# Patient Record
Sex: FEMALE | Race: WHITE | NOT HISPANIC OR LATINO | Employment: FULL TIME | URBAN - METROPOLITAN AREA
[De-identification: names, ages, dates, MRNs, and addresses within clinical notes are randomized per-mention and may not be internally consistent; named-entity substitution may affect disease eponyms.]

---

## 2017-02-14 ENCOUNTER — GENERIC CONVERSION - ENCOUNTER (OUTPATIENT)
Dept: OTHER | Facility: OTHER | Age: 57
End: 2017-02-14

## 2017-07-21 ENCOUNTER — GENERIC CONVERSION - ENCOUNTER (OUTPATIENT)
Dept: OTHER | Facility: OTHER | Age: 57
End: 2017-07-21

## 2017-08-11 ENCOUNTER — GENERIC CONVERSION - ENCOUNTER (OUTPATIENT)
Dept: OTHER | Facility: OTHER | Age: 57
End: 2017-08-11

## 2017-09-22 ENCOUNTER — GENERIC CONVERSION - ENCOUNTER (OUTPATIENT)
Dept: OTHER | Facility: OTHER | Age: 57
End: 2017-09-22

## 2017-11-03 ENCOUNTER — GENERIC CONVERSION - ENCOUNTER (OUTPATIENT)
Dept: OTHER | Facility: OTHER | Age: 57
End: 2017-11-03

## 2017-12-28 ENCOUNTER — ALLSCRIPTS OFFICE VISIT (OUTPATIENT)
Dept: OTHER | Facility: OTHER | Age: 57
End: 2017-12-28

## 2017-12-29 ENCOUNTER — GENERIC CONVERSION - ENCOUNTER (OUTPATIENT)
Dept: OTHER | Facility: OTHER | Age: 57
End: 2017-12-29

## 2017-12-29 DIAGNOSIS — Z11.51 ENCOUNTER FOR SCREENING FOR HUMAN PAPILLOMAVIRUS (HPV): ICD-10-CM

## 2017-12-29 DIAGNOSIS — Z12.4 ENCOUNTER FOR SCREENING FOR MALIGNANT NEOPLASM OF CERVIX: ICD-10-CM

## 2017-12-29 PROCEDURE — G0145 SCR C/V CYTO,THINLAYER,RESCR: HCPCS | Performed by: INTERNAL MEDICINE

## 2017-12-29 PROCEDURE — 87624 HPV HI-RISK TYP POOLED RSLT: CPT | Performed by: INTERNAL MEDICINE

## 2018-01-02 ENCOUNTER — GENERIC CONVERSION - ENCOUNTER (OUTPATIENT)
Dept: OTHER | Facility: OTHER | Age: 58
End: 2018-01-02

## 2018-01-02 ENCOUNTER — LAB REQUISITION (OUTPATIENT)
Dept: LAB | Facility: HOSPITAL | Age: 58
End: 2018-01-02
Payer: COMMERCIAL

## 2018-01-02 DIAGNOSIS — Z12.4 ENCOUNTER FOR SCREENING FOR MALIGNANT NEOPLASM OF CERVIX: ICD-10-CM

## 2018-01-02 DIAGNOSIS — Z11.51 ENCOUNTER FOR SCREENING FOR HUMAN PAPILLOMAVIRUS (HPV): ICD-10-CM

## 2018-01-02 LAB
25(OH)D3 SERPL-MCNC: 33 NG/ML (ref 30–100)
BUN SERPL-MCNC: 20 MG/DL (ref 7–25)
BUN/CREA RATIO (HISTORICAL): 18 (CALC) (ref 6–22)
CHLORIDE SERPL-SCNC: 103 MMOL/L (ref 98–110)
CHOLEST SERPL-MCNC: 203 MG/DL
CHOLEST/HDLC SERPL: 2.3 (CALC)
CO2 SERPL-SCNC: 28 MMOL/L (ref 20–31)
CREAT SERPL-MCNC: 1.1 MG/DL (ref 0.5–1.05)
EGFR AFRICAN AMERICAN (HISTORICAL): 65 ML/MIN/1.73M2
EGFR-AMERICAN CALC (HISTORICAL): 56 ML/MIN/1.73M2
GLUCOSE (HISTORICAL): 81 MG/DL (ref 65–99)
HDLC SERPL-MCNC: 88 MG/DL
HEPATITIS C ANTIBODY (HISTORICAL): NORMAL
LDL CHOLESTEROL (HISTORICAL): 100 MG/DL (CALC)
NON-HDL-CHOL (CHOL-HDL) (HISTORICAL): 115 MG/DL (CALC)
POTASSIUM SERPL-SCNC: 4.2 MMOL/L (ref 3.5–5.3)
SIGNAL TO CUT-OFF (HISTORICAL): 0.11
SODIUM SERPL-SCNC: 140 MMOL/L (ref 135–146)
TRIGL SERPL-MCNC: 66 MG/DL
TSH SERPL DL<=0.05 MIU/L-ACNC: 1.41 MIU/L

## 2018-01-05 LAB
HPV RRNA GENITAL QL NAA+PROBE: NORMAL
LAB AP GYN PRIMARY INTERPRETATION: NORMAL
Lab: NORMAL

## 2018-01-06 ENCOUNTER — GENERIC CONVERSION - ENCOUNTER (OUTPATIENT)
Dept: OTHER | Facility: OTHER | Age: 58
End: 2018-01-06

## 2018-01-12 NOTE — RESULT NOTES
Verified Results  (1) CBC/PLT/DIFF [id number] 07:17AM [facility]     Test Name Result Flag Reference   WHITE BLOOD CELL COUNT 5 3 Thousand/uL  3 8-10 8   RED BLOOD CELL COUNT 4 42 Million/uL  3 80-5 10   HEMOGLOBIN 12 8 g/dL  11 7-15 5   HEMATOCRIT 41 0 %  35 0-45 0   MCV 92 8 fL  80 0-100 0   MCH 29 0 pg  27 0-33 0   MCHC 31 3 g/dL L 32 0-36 0   RDW 14 4 %  11 0-15 0   PLATELET COUNT 502 Thousand/uL  140-400   MPV 9 6 fL  7 5-11 5   ABSOLUTE NEUTROPHILS 3535 cells/uL  3357-2297   ABSOLUTE LYMPHOCYTES 1283 cells/uL  850-3900   ABSOLUTE MONOCYTES 376 cells/uL  200-950   ABSOLUTE EOSINOPHILS 74 cells/uL     ABSOLUTE BASOPHILS 32 cells/uL  0-200   NEUTROPHILS 66 7 %     LYMPHOCYTES 24 2 %     MONOCYTES 7 1 %     EOSINOPHILS 1 4 %     BASOPHILS 0 6 %       (1) COMPREHENSIVE METABOLIC PANEL [id number] 76:16CF [facility]     Test Name Result Flag Reference   GLUCOSE 83 mg/dL  65-99   Fasting reference interval   UREA NITROGEN (BUN) 22 mg/dL  7-25   CREATININE 1 03 mg/dL  0 50-1 05   For patients >52years of age, the reference limit  for Creatinine is approximately 13% higher for people  identified as -American  eGFR NON-AFR   AMERICAN 62 mL/min/1 73m2  > OR = 60   eGFR AFRICAN AMERICAN 72 mL/min/1 73m2  > OR = 60   BUN/CREATININE RATIO   4-43   NOT APPLICABLE (calc)   SODIUM 138 mmol/L  135-146   POTASSIUM 4 3 mmol/L  3 5-5 3   CHLORIDE 103 mmol/L     CARBON DIOXIDE 28 mmol/L  19-30   CALCIUM 9 5 mg/dL  8 6-10 4   PROTEIN, TOTAL 7 4 g/dL  6 1-8 1   ALBUMIN 4 7 g/dL  3 6-5 1   GLOBULIN 2 7 g/dL (calc)  1 9-3 7   ALBUMIN/GLOBULIN RATIO 1 7 (calc)  1 0-2 5   BILIRUBIN, TOTAL 0 5 mg/dL  0 2-1 2   ALKALINE PHOSPHATASE 53 U/L     AST 16 U/L  10-35   ALT 15 U/L  6-29     (1) LIPID PANEL, FASTING [id number] 07:17AM [facility]     Test Name Result Flag Reference   CHOLESTEROL, TOTAL 172 mg/dL  125-200   HDL CHOLESTEROL 78 mg/dL  > OR = 46   TRIGLICERIDES 47 mg/dL  <977   LDL-CHOLESTEROL 85 mg/dL (calc) <130   Desirable range <100 mg/dL for patients with CHD or  diabetes and <70 mg/dL for diabetic patients with  known heart disease  CHOL/HDLC RATIO 2 2 (calc)  < OR = 5 0   NON HDL CHOLESTEROL 94 mg/dL (calc)     Target for non-HDL cholesterol is 30 mg/dL higher than   LDL cholesterol target  (1)  [id number] 07:17AM [facility]   REPORT COMMENT:  FASTING:YES     Test Name Result Flag Reference    22 U/mL  <35   This test was performed using the Sheldon Soledad  Chemiluminescent method  Values obtained from  different assay methods cannot be used  interchangeably   levels, regardless of  value, should not be interpreted as absolute  evidence of the presence or absence of disease       (Q) TSH, 3RD GENERATION [id number] 07:17AM [facility]     Test Name Result Flag Reference   TSH 0 87 mIU/L     Reference Range                         > or = 20 Years  0 40-4 50                              Pregnancy Ranges            First trimester    0 26-2 66            Second trimester   0 55-2 73            Third trimester    0 43-2 91       Discussion/Summary   Eze Coe, all the blood tests are normal      Signatures   Electronically signed by : KEYONA Vila ; Mar 14 2016  9:41AM EST                       (Author)

## 2018-01-13 NOTE — RESULT NOTES
Verified Results  MAMMO DIAGNOSTIC BILATERAL W CAD [id number] 05:04PM [facility]     Test Name Result Flag Reference   MAMMO DIAGNOSTIC BILATERAL W CAD (Report)     Patient History:   Patient has history of breast cancer at age 41 and had first    child at age 30  Family history of colorectal cancer in 2 maternal cousins, breast   cancer in sister at age 39, ovarian cancer in sister at age 48,    and breast cancer in sister at age 47  Benign lumpectomy of the right breast, March 22, 2011  Malignant   WB stereo brst biopsy of the right breast, February 23, 2011  Benign -WBUS BREAST GUIDANCE of the right breast, July 17, 2008  Reason for exam: history of breast cancer, conservation therapy  History of right lumpectomy  Mammo Diagnostic Bilateral W CAD: August 30, 2016 - Check In #:    [de-identified]   Bilateral MLO and CC view(s) were taken  CC with magnification    and ML with magnification view(s) were taken of the right breast      Technologist: YESENIA Lewis (YESENIA)(M)   Prior study comparison: August 28, 2015, bilateral WB digitl    bilat luis m performed at [facility]  August 27, 2014, bilateral WB digitl bilat luis m performed at [facility]  August 26, 2013, bilateral WB digitl    bilat luis m performed at [facility]  August 23, 2012, bilateral WB digitl bilat luis m performed at [facility]  August 22, 2011, right breast digital    diagnostic mammogram, performed at [facility]  March 22, 2011, right breast digital diagnostic mammogram,    performed at [facility]  February 23, 2011, right breast   unilateral diagnostic mammogram performed at [facility]  February 16, 2011, right breast unilateral    diagnostic mammogram performed at [facility]   August 26, 2010, right breast unilateral diagnostic    mammogram performed at [address] [place] [address] [facility]  August 19, 2010, digital bilateral screening mammogram, performed at    [facility]  The breast tissue is heterogeneously dense, potentially limiting    the sensitivity of mammography  Patient risk, included in this    report, assists in determining the appropriate screening regimen    (such as 3-D mammography or the inclusion of automated breast    ultrasound or MRI)  3-D mammography may also remain indicated as    screening  These images were obtained using digital technique    and with the assistance of Computer Aided Detection  Asymmetric    density and calcifications are present in the left breast  Spot    magnification views of the upper inner left breast calcifications   demonstrate them as indeterminate and stereotactic core biopsy    is recommended for further evaluation  An additional more faint    cluster is identified slightly inferior to the more coarse    cluster  If these could be identified on stereotactic imaging, a   2nd stereotactic core biopsy may be indicated  Otherwise,    excisional biopsy may be necessary  Previously described    asymmetric density in the medial left breast does not persist    reliably persist on spot compression views  If calcifications prove positive by biopsy, the patient would    benefit from MRI for further evaluation  ASSESSMENT: BiRad:4 - Suspicious - Left     Recommendation:   Stereotactic biopsy of the left breast x 2 if the more inferior    site can be visualized  Analyzed by CAD     8-10% of cancers will be missed on mammography  Management of a    palpable abnormality must be based on clinical grounds  Patients   will be notified of their results via letter from our facility  Accredited by [employer] of Radiology and FDA       Transcription Location: YESENIA Sifuentes 98: [id number]     Risk Value(s):   Myriad Table: 26 6%, MRS : Based on personal and/or    family history, consideration of hereditary risk assessment may    be warranted     Signed by:   Waynette Primrose, MD   8/30/16       Signatures   Electronically signed by : KEYONA Krueger ; Sep  2 2016 12:38PM EST                       (Author)

## 2018-01-14 NOTE — PROGRESS NOTES
Assessment   1  Screening for cervical cancer (V76 2) (Z12 4)  2  Hypothyroidism (244 9) (E03 9)  3  Screening for hyperlipidemia (V77 91) (Z13 220)  4  DM (diabetes mellitus screen) (V77 1) (Z13 1)  5  History of carcinoma in situ of breast (V13 89) (Z86 000)    Plan  DM (diabetes mellitus screen), History of carcinoma in situ of breast, Hypothyroidism,  FamHx: Ovarian Cancer    · (1) ; Status:Active; Requested for:19Feb2016;   Perform:[facility]; QYT:[id number];PFVWKVW; For:DM (diabetes mellitus   screen), History of carcinoma in situ of breast, Hypothyroidism,   FamHx: Ovarian Cancer; Ordered By:Reyes, Lea;   · (1) CBC/PLT/DIFF; Status:Active; Requested for:19Feb2016;   Perform:[facility]; JVE:[id number];VVPMNPG; For:DM (diabetes mellitus   screen), History of carcinoma in situ of breast, Hypothyroidism,   FamHx: Ovarian Cancer; Ordered By:Reyes, Lea;   · (1) COMPREHENSIVE METABOLIC PANEL; Status:Active; Requested for:19Feb2016;   Perform:[facility]; PELON:35HDY3216;IKJZAHH; For:DM (diabetes mellitus   screen), History of carcinoma in situ of breast, Hypothyroidism,   FamHx: Ovarian Cancer; Ordered By:Reyes, Lea;   · (1) LIPID PANEL, FASTING; Status:Active; Requested for:19Feb2016;   Perform:[facility]; JBF:[id number];NEFYDKP; For:DM (diabetes mellitus   screen), History of carcinoma in situ of breast, Hypothyroidism,   FamHx: Ovarian Cancer; Ordered By:Reyes, Lea;   · (1) TSH; Status:Active; Requested for:19Feb2016;   Perform:[facility]; QXD:38QZS6756;MYBLSDO; For:DM (diabetes mellitus   screen), History of carcinoma in situ of breast, Hypothyroidism,   FamHx: Ovarian Cancer; Ordered By:Reyes, Lea;   History of carcinoma in situ of breast    · MAMMO DIAGNOSTIC BILATERAL W CAD; Status:Hold For - Exact Date,Scheduling;  Requested for:Aug 2016;   Perform:[facility]; ZXZ:17FML7696;WYVWHLG; For:History of carcinoma in situ   of breast; Ordered By:Reyes, Lea;  Screening for cervical cancer    · (1) THIN PREP PAP WITH IMAGING; Status:Active; Requested for:38Fgv1816;   Perform:[facility]; MAW:44YGQ0768;SQXFEWI; For:Screening for cervical   cancer; Ordered By:Reyes, Lea;  PAP Maturation index required? : No  LMP: : Dec 2015  Reflex HPV? : Regardless of Interpretation   · (1) THIN PREP PAP WITH IMAGING; Status:Active; Requested for:22Uzp5172;   Perform:[facility]; MWO:67DLV1987;ZEOHXJZ; For:Screening for cervical cancer; Ordered By:Reyes, Lea;  PAP Maturation index required? : No  Reflex HPV? : Regardless of Interpretation    Discussion/Summary  health maintenance visit Currently, she eats an adequate diet and has an adequate exercise regimen  cervical cancer screening is current HPV and Pap Co-testing Done Today Breast cancer screening: mammogram is current and mammogram has been ordered  Colorectal cancer screening: colonoscopy is needed every ten years  Osteoporosis screening: bone mineral density testing is not indicated  Screening lab work includes hemoglobin, glucose and lipid profile  Chief Complaint  Gyn visit      History of Present Illness  HPI: requesting  testing bec of strong h/o ovarian cancer in family  willing to pay out of pocket   GYN HM, Adult [facility]: The patient is being seen for a gynecology evaluation  The last health maintenance visit was year(s) ago  General Health:   Reproductive health: the patient is perimenopausal   LMP Dec 25 2015  Screening: Cervical cancer screening includes a pap smear performed last year  Breast cancer screening includes a mammogram performed last year  Colorectal cancer screening includes a colonoscopy performed within the past ten years  metabolic screening reviewed and current        Review of Systems  no pelvic pain, no pelvic pressure, no vaginal pain, no vaginal discharge, no vaginal itching, no vaginal lump or mass, no vaginal odor, no nonmenstrual bleeding and no dysuria  Cardiovascular: varicose veins in right upper thigh, but no lower extremity edema  Other Symptoms: hot flashes  OB History  Pregnancy History (Brief):   Prior pregnancies: : 3  Para: 3 (living)       Active Problems   1  Basal cell carcinoma of skin (173 91) (C44 91)  2  Colon cancer screening (V76 51) (Z12 11)  3  DM (diabetes mellitus screen) (V77 1) (Z13 1)  4  Esophageal reflux (530 81) (K21 9)  5  History of carcinoma in situ of breast (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 89) (Z86 000)  6  Hypothyroidism (244 9) (E03 9)  7   Screening for cervical cancer (V76 2) (Z12 4)    Past Medical History    · History of Abdominal pain, RUQ (789 01) (R10 11)   · History of Age At First Period 17 Years Old (Menarche)   · History of Age At First Pregnancy 34 Years Old   · History of Atypical chest pain (786 59) (R07 89)   · History of Atypical chest pain (786 59) (R07 89)   · History of Breast Cancer (V10 3)   · History of Cervical Disc Degeneration C5 - C6 (722 4)   · History of Chest tightness or pressure (786 59) (R07 89)   · History of Duct, Solid Type, 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)   · History of Herpes zoster (053 9) (B02 9)   · History of basal cell carcinoma (V10 83) (Z85 828)   · History of hypothyroidism (V12 29) (Z86 39)   · History of Lump of skin (782 2) (R22 9)   · History of Lymphadenopathy (785 6) (R59 1)   · History of Neck strain (847 0) (S16 1XXA)   · History of Palpitations (785 1) (R00 2)   · History of Pap smear for cervical cancer screening (V76 2) (Z12 4)   · 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ies   · History of Tachycardia (785 0) (R00 0)    Surgical History    · History of Breast Surgery Lumpectomy   · History of Nose Surgery   · History of Reported Hx Of Breast Surgery For Biopsy    Family History    · Family history of Leukemia (V16 6)    · Family history of Breast Cancer (V16 3)   · Family history of Breast Cancer (V16 3)   · Family history of Breast Cancer (V16 3)   · Family history of Carcinoma Of The Anus   · Family history of Ovarian Cancer (V16 41)   · Family history of Ovarian Cancer (V16 41)    · Family history of Non-Hodgkin's Lymphoma    · Family history of Colon Cancer (V16 0)   · Family history of Malignant Melanoma Of The Skin (V16 8)    Social History    · Being A Social Drinker   · Exercising Regularly   · Never A Smoker    Current Meds  1  Levothyroxine Sodium 112 MCG Oral Tablet; Take 1 tablet daily; Therapy: 34GOP1130 to (Evaluate:22Oct2016)  Requested for: 24FKZ4273; Last   Rx:26Jan2016 Ordered  2  Omeprazole 20 MG Oral Capsule Delayed Release; TAKE ONE CAPSULE EVERY DAY; Therapy: 15AJY8554 to (Evaluate:55Reo1460)  Requested for: 99EJZ6194; Last   Rx:77Jsy9585 Ordered  3  Synthroid 112 MCG Oral Tablet; TAKE 1 TABLET DAILY; Therapy: 61TYU1585 to (Evaluate:93Ifa0646)  Requested for: 53CAE4431; Last   Rx:16Jun2014 Ordered    Allergies   1  No Known Drug Allergies   2  No Known Environmental Allergies  3  No Known Food Allergies    Vitals   Recorded: 85GOR4691 03:58PM   Heart Rate 68   Respiration 18   Systolic 667, LUE, Sitting   Diastolic 62, LUE, Sitting   Height 5 ft 5 in   Weight 140 lb    BMI Calculated 23 3   BSA Calculated 1 7     Physical Exam    Constitutional   General appearance: No acute distress, well appearing and well nourished  Genitourinary   External genitalia: Normal and no lesions appreciated  Vagina: Normal, no lesions or dryness appreciated  Urethral meatus: Normal     Cervix: Normal, no palpable masses  Uterus: Normal, non-tender, not enlarged, and no palpable masses  Adnexa/parametria: Normal, non-tender and no fullness or masses appreciated  Anus, perineum, and rectum: Normal sphincter tone, no masses, and no prolapse  Heme negative stool   Chest   Breasts: Normal and no dimpling or skin changes noted         Future Appointments    Date/Time Provider Specialty Site   02/20/2017 04:00 PM KEYONA Rodriguez   [facility]     Signatures   Electronically signed by : KEYONA Lugo ; Feb 19 2016  5:29PM EST                       (Author)

## 2018-01-15 NOTE — RESULT NOTES
Verified Results  (1) THIN PREP PAP WITH IMAGING 80RBX3549 10:46AM Sally Gross   Other High Risk HPV Negative, HPV 16 Negative, HPV 18 Negative  HPV types: 16,18,31,33,35,39,45,51,52,56,58,59,66 and 68 DNA are undetectable or below the pre-set threshold  Roche's FDA approved Sharri 4800 is utilized with strict adherence to the 's instruction  manual to test for the presence of High-Risk HPV DNA, as well as HPV 16 and HPV 18  This instrument  has been validated by our laboratory and/or by the   A negative result does not preclude the presence of HPV infection because results depend on adequate  specimen collection, absence of inhibitors and sufficient DNA to be detected  Additionally, HPV negative  results are not intended to prevent women from proceeding to colposcopy if clinically warranted  Positive HPV test results indicate the presence of any one or more of the high risk types, but since patients  are often co-infected with low-risk types it does not rule out the presence of low-risk types in patients  with mixed infections  Test Name Result Flag Reference   HPV, HIGH-RISK   HPV NEG, HPV16 NEG, HPV18 NEG   HPV NEG, HPV16 NEG, HPV18 NEG     (1) THIN PREP PAP WITH IMAGING [id number] 10:46AM Sally Ochoa     Test Name Result Flag Reference   LAB AP CASE REPORT (Report)     Gynecologic Cytology Report            Case: [id number]                  Authorizing Provider: Zaira Bailey MD       Collected:      02/19/2016 1046        First Screen:     ALIX Hernandez    Received:      02/22/2016 1046        Specimen:  LIQUID-BASED PAP, SCREENING, Cervix   LAB AP GYN PRIMARY INTERPRETATION      Negative for intraepithelial lesion or malignancy   LAB AP GYN SPECIMEN ADEQUACY      Satisfactory for evaluation  Endocervical/transformation zone component present     LAB AP GYN ADDITIONAL INFORMATION (Report)     [employer]'s FDA approved ,  and ThinPrep Imaging System are   utilized with strict adherence to the 's instruction manual to   prepare gynecologic and non-gynecologic cytology specimens for the   production of ThinPrep slides as well as for gynecologic ThinPrep imaging  These processes have been validated by our laboratory and/or by the     The Pap test is not a diagnostic procedure and should not be used as the   sole means to detect cervical cancer  It is only a screening procedure to   aid in the detection of cervical cancer and its precursors  Both   false-negative and false-positive results have been experienced  Your   patient's test result should be interpreted in this context together with   the history and clinical findings         Discussion/Summary   Prashant Medrano, your pap smear is normal      Signatures   Electronically signed by : KEYONA Cash ; Feb 25 2016  4:29PM EST                       (Author)

## 2018-01-22 VITALS
HEIGHT: 65 IN | SYSTOLIC BLOOD PRESSURE: 104 MMHG | DIASTOLIC BLOOD PRESSURE: 66 MMHG | OXYGEN SATURATION: 98 % | BODY MASS INDEX: 23.37 KG/M2 | HEART RATE: 75 BPM | WEIGHT: 140.25 LBS

## 2018-01-23 NOTE — RESULT NOTES
Message   Her creatinine or kidney function is slightly elevated- 1 1, should be under 1 05   I would like to recheck it, nonfasting this time in 2 weeks   The rest of the blood tests is normal   I will enter the order for the creatinine-she goes to [facility] so fax it to them please        Verified Results  (Q) BASIC METABOLIC PANEL W/O CA [id number] 09:37AM Martín Hayward     Test Name Result Flag Reference   GLUCOSE 81 mg/dL  65-99   Fasting reference interval   UREA NITROGEN (BUN) 20 mg/dL  7-25   CREATININE 1 10 mg/dL H 0 50-1 05   For patients >52years of age, the reference limit  for Creatinine is approximately 13% higher for people  identified as -American  eGFR NON-AFR  AMERICAN 56 mL/min/1 73m2 L > OR = 60   eGFR AFRICAN AMERICAN 65 mL/min/1 73m2  > OR = 60   BUN/CREATININE RATIO 18 (calc)  6-22   SODIUM 140 mmol/L  135-146   POTASSIUM 4 2 mmol/L  3 5-5 3   CHLORIDE 103 mmol/L     CARBON DIOXIDE 28 mmol/L  20-31     (Q) TSH, 3RD GENERATION W/REFLEX TO FT4 [id number] 09:37AM Martín Hayward   REPORT COMMENT:  FASTING:YES     Test Name Result Flag Reference   TSH W/REFLEX TO FT4 1 41 mIU/L     Reference Range                         > or = 20 Years  0 40-4 50                              Pregnancy Ranges            First trimester    0 26-2 66            Second trimester   0 55-2 73            Third trimester    0 43-2 91     (Q) LIPID PANEL WITH REFLEX TO DIRECT LDL [id number] 09:37AM Martín Hayward     Test Name Result Flag Reference   CHOLESTEROL, TOTAL 203 mg/dL H <200   HDL CHOLESTEROL 88 mg/dL  >59   TRIGLICERIDES 66 mg/dL  <069   LDL-CHOLESTEROL 100 mg/dL (calc) H    Reference range: <100     Desirable range <100 mg/dL for patients with CHD or  diabetes and <70 mg/dL for diabetic patients with  known heart disease       LDL-C is now calculated using the Luther-Enriquez   calculation, which is a validated novel method providing   better accuracy than the Friedewald equation in the   estimation of LDL-C  Veila Rojas  [facility]   0564;510(80): [phone]   (http://AdTaily.com/faq/JGF619)   CHOL/HDLC RATIO 2 3 (calc)  <5 0   NON HDL CHOLESTEROL 115 mg/dL (calc)  <130   For patients with diabetes plus 1 major ASCVD risk   factor, treating to a non-HDL-C goal of <100 mg/dL   (LDL-C of <70 mg/dL) is considered a therapeutic   option  *(Q) VITAMIN D, 25-HYDROXY, LC/MS/MS [id number] 09:37AM [facility]     Test Name Result Flag Reference   VITAMIN D, 25-OH, TOTAL 33 ng/mL     Vitamin D Status         25-OH Vitamin D:     Deficiency:                    <20 ng/mL  Insufficiency:             20 - 29 ng/mL  Optimal:                 > or = 30 ng/mL     For 25-OH Vitamin D testing on patients on   D2-supplementation and patients for whom quantitation   of D2 and D3 fractions is required, the [facility]TM)  25-OH VIT D, (D2,D3), LC/MS/MS is recommended: order   code 78524 (patients >2yrs)  For more information on this test, go to:  http://Handprint/faq/EGN913  (This link is being provided for   informational/educational purposes only )     (Q) HEPATITIS C ANTIBODY [id number] 09:37AM [facility]     Test Name Result Flag Reference   HEPATITIS C ANTIBODY NON-REACTIVE  NON-REACTIVE   SIGNAL TO CUT-OFF 0 11  <1 00       Plan  Elevated serum creatinine    · (Q) CREATININE W/O eGFR; Status:Active;  Requested IJI:34XHN1854;     Signatures   Electronically signed by : KEYONA Fernandes ; Jan 2 2018 12:06PM EST                       (Author)

## 2018-01-23 NOTE — RESULT NOTES
Verified Results  (1) THIN PREP PAP WITH IMAGING [id number] 09:29AM Alberto Levo     Test Name Result Flag Reference   LAB AP CASE REPORT (Report)     Gynecologic Cytology Report            Case: [id number]                  Authorizing Provider: Natalie Nelson MD       Collected:      12/29/2017           First Screen:     Tawnya Harada, CT   Received:      01/03/2018 1025        Specimen:  LIQUID-BASED PAP, SCREENING, Cervix   HPV HIGH RISK RESULT (Report)     HPV, High Risk: HPV NEG, HPV16 NEG, HPV18 NEG      Other High Risk HPV Negative, HPV 16 Negative, HPV 18 Negative  HPV types: 16,18,31,33,35,39,45,51,52,56,58,59,66 and 68 DNA are undetectable or below the pre-set threshold  Matter and Form FDA approved Sharri 4800 is utilized with strict adherence to the manufacturers instruction  manual to test for the presence of High-Risk HPV DNA, as well as HPV 16 and HPV 18  This instrument  has been validated by our laboratory and/or by the   A negative result does not preclude the presence of HPV infection because results depend on adequate  specimen collection, absence of inhibitors and sufficient DNA to be detected  Additionally, HPV negative  results are not intended to prevent women from proceeding to colposcopy if clinically warranted  Positive HPV test results indicate the presence of any one or more of the high risk types, but since patients  are often co-infected with low-risk types it does not rule out the presence of low-risk types in patients  with mixed infections  LAB AP GYN PRIMARY INTERPRETATION      Negative for intraepithelial lesion or malignancy  Electronically signed by Tawnya Harada, CT on 1/5/2018 at 9:29 AM   LAB AP GYN SPECIMEN ADEQUACY      Satisfactory for evaluation  Endocervical/transformation zone component present     LAB AP GYN ADDITIONAL INFORMATION (Report)     [employer]'s FDA approved ,  and ThinPrep Imaging System are   utilized with strict adherence to the 's instruction manual to   prepare gynecologic and non-gynecologic cytology specimens for the   production of ThinPrep slides as well as for gynecologic ThinPrep imaging  These processes have been validated by our laboratory and/or by the     The Pap test is not a diagnostic procedure and should not be used as the   sole means to detect cervical cancer  It is only a screening procedure to   aid in the detection of cervical cancer and its precursors  Both   false-negative and false-positive results have been experienced  Your   patient's test result should be interpreted in this context together with   the history and clinical findings     LAB AP LMP NR     NR       Discussion/Summary   Your pap is normal      Signatures   Electronically signed by : KEYONA Ramirez ; Jan 6 2018  1:33PM EST                       (Author)

## 2018-01-23 NOTE — PROGRESS NOTES
Assessment    1  Encounter for preventive health examination (V70 0) (Z00 00)   2  Need for hepatitis C screening test (V73 89) (Z11 59)   3  Encounter for vitamin deficiency screening (V77 99) (Z13 21)   4  Malignant neoplasm of left breast in female, estrogen receptor negative, unspecified site   of breast (174 9,V86 1) (C50 912,Z17 1)   5  Dyspareunia (625 0)   6  Hot flashes, menopausal (627 2) (N95 1)   7  Hypothyroidism (244 9) (E03 9)    Plan  Encounter for vitamin deficiency screening    · *(Q) VITAMIN D, 25-HYDROXY, LC/MS/MS; Status:Active; Requested for:05Yvl0051;   Hypothyroidism    · (Q) BASIC METABOLIC PANEL W/O CA; Status:Active; Requested for:23Pvr3146;    · (Q) LIPID PANEL WITH REFLEX TO DIRECT LDL; Status:Active; Requested  for:25Tbg8277;    · (Q) TSH, 3RD GENERATION W/REFLEX TO FT4; Status:Active; Requested  for:74Vgm8667;   Need for hepatitis C screening test    · (Q) HEPATITIS C ANTIBODY; Status:Active; Requested for:93Eiq8152;     Discussion/Summary  health maintenance visit Currently, she eats an adequate diet and has an adequate exercise regimen  Scheduled for tomorrow Breast cancer screening: mammogram is current and breast cancer screening is managed by Dr Jayla Faulkner  Colorectal cancer screening: Overdue, will call to schedule  Screening lab work includes lipid profile, thyroid function testing and 25-hydroxyvitamin D  The immunizations are up to date  Hepatitis C Screening: the patient was counseled on Hepatitis C screening  The patient agrees to Hepatitis C screening  The patient was counseled regarding diagnostic results, impressions  Possible side effects of new medications were reviewed with the patient/guardian today  The treatment plan was reviewed with the patient/guardian  The patient/guardian understands and agrees with the treatment plan      Chief Complaint  Wellness visit  History of Present Illness  HM, Adult Female:  The patient is being seen for a health maintenance evaluation  The last health maintenance visit was >1 year(s) ago  Social History: She is   Work status: working full time  The patient has never smoked cigarettes  She reports never drinking alcohol  The patient has no concerns about alcohol abuse  She has never used illicit drugs  General Health: The patient's health since the last visit is described as good  She has regular dental visits  The patient reports her last dental visit was 12/2017  She complains of vision problems  Vision care includes wearing glasses and an eye examination more than a year ago  She denies hearing loss  Hearing is normal  Immunizations status: up to date  Lifestyle:  She consumes a diverse and healthy diet  She is on a diet and is generally adherent  She does not have any weight concerns  She exercises regularly  She exercises 3 or more times per week for 30 or more minutes per session  Exercise includes walking and running/jogging  She does not use tobacco  The patient has never smoked cigarettes  She denies alcohol use  She reports never drinking alcohol  She denies drug use  She has never used illicit drugs  Reproductive health: the patient is postmenopausal   she reports normal menses  she uses no contraception  she is sexually active  Screening: Cervical cancer screening includes a pap smear performed Feb 2016  Colorectal cancer screening includes a colonoscopy performed 2011  Metabolic screening includes lipid profile performed within the past five years, glucose screening performed this year and thyroid function test performed last year  risk screening reviewed and current  Additional History:  s/p left breast lumpectomy, radiation, chemo  Finished Herceptin last month  Port removed as well  Review of Systems    Constitutional: no fever, no recent weight gain, no chills and no recent weight loss  ENT: no sore throat and no nasal discharge  Cardiovascular: no chest pain and no palpitations  Respiratory: no shortness of breath and no cough  Gastrointestinal: heartburn, stopped omeprazole on her own a while ago, but no abdominal pain, no constipation and no diarrhea  Genitourinary: dyspareunia, but no unexplained vaginal bleeding    The patient presents with complaints of occasional episodes of dysuria  Neurological: left arm tingling she suspects is from her neck  Endocrine: hot flashes  Active Problems    1  Abnormal finding on mammography, microcalcification (793 81) (R92 0)   2  Arthralgia (719 40) (M25 50)   3  Basal cell carcinoma of skin (173 91) (C44 91)   4  Colon cancer screening (V76 51) (Z12 11)   5  DM (diabetes mellitus screen) (V77 1) (Z13 1)   6  Encounter for screening mammogram for breast cancer (V76 12) (Z12 31)   7  Esophageal reflux disease (530 81) (K21 9)   8  History of carcinoma in situ of breast (V13 89) (Z86 000)   9  Hypothyroidism (244 9) (E03 9)   10  Other fatigue (780 79) (R53 83)   11  Screening for cervical cancer (V76 2) (Z12 4)   12  Screening for hyperlipidemia (V77 91) (Z13 220)   13   Varicose veins of both lower extremities with pain (454 8) (I85 983)    Past Medical History    · History of Abdominal pain, RUQ (789 01) (R10 11)   · History of Age At First Period 17 Years Old (Menarche)   · History of Age At First Pregnancy 34 Years Old   · History of Atypical chest pain (786 59) (R07 89)   · History of Atypical chest pain (786 59) (R07 89)   · History of Breast Cancer (V10 3)   · History of Cervical Disc Degeneration C5 - C6 (722 4)   · History of Chest tightness or pressure (786 59) (R07 89)   · History of Duct, Solid Type, 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)   · History of Herpes zoster (053 9) (B02 9)   · History of basal cell carcinoma (V10 83) (Z85 828)   · History of hypothyroidism (V12 29) (Z86 39)   · History of Lump of skin (782 2) (R22 9)   · History of Lymphadenopathy (785 6) (R59 1)   · History of Neck strain (847 0) (S16 1XXA)   · History of Palpitations (785 1) (R00 2)   · History of Pap smear for cervical cancer screening (V76 2) (Z12 4)   · 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[employer]   · History of Tachycardia (785 0) (R00 0)    Surgical History    · History of Breast Surgery Lumpectomy   · History of Left Breast Lumpectomy   · History of Nose Surgery   · History of Reported Hx Of Breast Surgery For Biopsy    Family History  Father    · Family history of Leukemia (V16 6)  Sister    · Family history of Breast Cancer (V16 3)   · Family history of Breast Cancer (V16 3)   · Family history of Breast Cancer (V16 3)   · Family history of Carcinoma Of The Anus   · Family history of Ovarian Cancer (V16 41)   · Family history of Ovarian Cancer (V16 41)  Brother    · Family history of Non-Hodgkin's Lymphoma  Family History    · Family history of Colon Cancer (V16 0)   · Family history of Malignant Melanoma Of The Skin (V16 8)    Social History    · Being A Social Drinker   · Exercising Regularly   · Non-smoker (V49 89) (Z78 9)    Current Meds   1  Levothyroxine Sodium 112 MCG Oral Tablet; Take 1 tablet daily; Therapy: 50EVQ2334 to (Evaluate:14Nnc0402)  Requested for: 13BEL6524; Last   Rx:58Tyd3246 Ordered    Allergies    1  No Known Drug Allergies    2  No Known Environmental Allergies   3  No Known Food Allergies    Vitals   Recorded: 72Zzf5180 09:23AM   Heart Rate 75   Systolic 906   Diastolic 66   Height 5 ft 5 in   Weight 140 lb 4 oz   BMI Calculated 23 34   BSA Calculated 1 7   O2 Saturation 98     Physical Exam    Constitutional   General appearance: No acute distress, well appearing and well nourished  Head and Face   Head and face: Normal     Eyes   Conjunctiva and lids: No swelling, erythema or discharge  Ears, Nose, Mouth, and Throat   Otoscopic examination: Tympanic membranes translucent with normal light reflex  Canals patent without erythema  Oropharynx: Normal with no erythema, edema, exudate or lesions      Neck Neck: Supple, symmetric, trachea midline, no masses  Thyroid: Normal, no thyromegaly  Pulmonary   Respiratory effort: No increased work of breathing or signs of respiratory distress  Auscultation of lungs: Clear to auscultation  Cardiovascular   Auscultation of heart: Normal rate and rhythm, normal S1 and S2, no murmurs  Abdomen   Abdomen: Non-tender, no masses  Liver and spleen: No hepatomegaly or splenomegaly  Lymphatic   Palpation of lymph nodes in neck: No lymphadenopathy      Musculoskeletal   Gait and station: Normal     Psychiatric   Orientation to person, place, and time: Normal     Mood and affect: Normal        Signatures   Electronically signed by : KEYONA Saxena ; Dec 28 2017 10:15AM EST                       (Author)

## 2018-01-24 VITALS
HEART RATE: 75 BPM | HEIGHT: 65 IN | WEIGHT: 138.13 LBS | OXYGEN SATURATION: 98 % | DIASTOLIC BLOOD PRESSURE: 64 MMHG | SYSTOLIC BLOOD PRESSURE: 116 MMHG | BODY MASS INDEX: 23.01 KG/M2

## 2018-05-01 DIAGNOSIS — E05.90 HYPERTHYROIDISM: Primary | ICD-10-CM

## 2018-05-01 RX ORDER — LEVOTHYROXINE SODIUM 112 UG/1
112 TABLET ORAL DAILY
Qty: 90 TABLET | Refills: 2 | Status: SHIPPED | OUTPATIENT
Start: 2018-05-01 | End: 2018-12-25 | Stop reason: SDUPTHER

## 2018-07-10 ENCOUNTER — OFFICE VISIT (OUTPATIENT)
Dept: INTERNAL MEDICINE CLINIC | Facility: CLINIC | Age: 58
End: 2018-07-10
Payer: COMMERCIAL

## 2018-07-10 ENCOUNTER — HOSPITAL ENCOUNTER (OUTPATIENT)
Dept: CT IMAGING | Facility: HOSPITAL | Age: 58
Discharge: HOME | End: 2018-07-10
Payer: COMMERCIAL

## 2018-07-10 VITALS
SYSTOLIC BLOOD PRESSURE: 112 MMHG | DIASTOLIC BLOOD PRESSURE: 70 MMHG | HEART RATE: 69 BPM | HEIGHT: 65 IN | WEIGHT: 140 LBS | BODY MASS INDEX: 23.32 KG/M2 | OXYGEN SATURATION: 99 %

## 2018-07-10 DIAGNOSIS — R10.13 EPIGASTRIC PAIN: ICD-10-CM

## 2018-07-10 DIAGNOSIS — R07.89 CHEST DISCOMFORT: Primary | ICD-10-CM

## 2018-07-10 DIAGNOSIS — R07.89 CHEST DISCOMFORT: ICD-10-CM

## 2018-07-10 PROCEDURE — 93000 ELECTROCARDIOGRAM COMPLETE: CPT | Performed by: NURSE PRACTITIONER

## 2018-07-10 PROCEDURE — 71275 CT ANGIOGRAPHY CHEST: CPT

## 2018-07-10 PROCEDURE — 99213 OFFICE O/P EST LOW 20 MIN: CPT | Performed by: NURSE PRACTITIONER

## 2018-07-10 RX ORDER — OMEPRAZOLE 20 MG/1
20 CAPSULE, DELAYED RELEASE ORAL 2 TIMES DAILY
Qty: 60 CAPSULE | Refills: 0 | Status: SHIPPED | OUTPATIENT
Start: 2018-07-10 | End: 2019-07-18 | Stop reason: ALTCHOICE

## 2018-07-10 RX ADMIN — IOHEXOL 85 ML: 350 INJECTION, SOLUTION INTRAVENOUS at 17:07

## 2018-07-10 NOTE — PROGRESS NOTES
Assessment/Plan:    EKG- NSR in office  Stat CTA to r/o PE  If negative, instructed patient to schedule ultrasound to r/o gallbladder disease  Trial omeprazole and instructed to follow a non-ulcergenic diet  Follow up in 1 month  Instructed go to the ER for any SOB or worsening lightheadedness or pain  Diagnoses and all orders for this visit:    Chest discomfort  -     CTA chest pe study; Future  -     US abdomen limited; Future  -     POCT ECG    Epigastric pain  -     US abdomen limited; Future  -     omeprazole (PriLOSEC) 20 mg delayed release capsule; Take 1 capsule (20 mg total) by mouth 2 (two) times a day    Other orders  -     Cancel: Ambulatory referral to Gastroenterology; Future          Subjective:      Patient ID: Felix Ruby Parent is a 56 y o  female  Here for chest/epigastric discomfort  Started 4 days ago   Radiates to her right shoulder/midback  Worse with taking a deep breath   Walking 3 miles daily and the last few days have been a little harder  She denies shortness of breath but has increasing fatigue   Today started to feel lightheaded   Denies any syncope  The pain sometimes gets worse after meals     Risk for PE includes: Recent long drive to [facility] a month ago and breast cancer history     She does have a hx of stomach ulcer and was on omeprazole which she stopped a few years ago           The following portions of the patient's history were reviewed and updated as appropriate: allergies, current medications, past family history, past medical history, past social history, past surgical history and problem list     Review of Systems   Constitutional: Positive for fatigue  HENT: Negative  Respiratory: Negative  Cardiovascular: Positive for chest pain  Negative for palpitations and leg swelling  Gastrointestinal: Positive for abdominal distention  Negative for diarrhea, nausea and vomiting  Epigastric pain   Genitourinary: Negative  Musculoskeletal: Negative      Skin: Negative  Neurological: Positive for light-headedness  Negative for syncope, facial asymmetry, speech difficulty, weakness, numbness and headaches  Objective:      /70 (BP Location: Right arm, Patient Position: Sitting, Cuff Size: Adult)   Pulse 69   Ht 5' 5" (1 651 m) Comment: pt reported  Wt 63 5 kg (140 lb) Comment: pt reported  SpO2 99%   BMI 23 30 kg/m²          Physical Exam   Constitutional: She is oriented to person, place, and time  She appears well-developed and well-nourished  HENT:   Right Ear: External ear normal    Left Ear: External ear normal    Eyes: Pupils are equal, round, and reactive to light  Cardiovascular: Normal rate, regular rhythm, normal heart sounds and intact distal pulses  Pulmonary/Chest: Effort normal and breath sounds normal    Abdominal: Soft  Bowel sounds are normal  There is tenderness in the right upper quadrant  Mild tenderness RUQ   Musculoskeletal: She exhibits no edema  Neurological: She is alert and oriented to person, place, and time  Skin: No rash noted  Psychiatric: She has a normal mood and affect  Her behavior is normal    Vitals reviewed

## 2018-07-11 ENCOUNTER — TELEPHONE (OUTPATIENT)
Dept: INTERNAL MEDICINE CLINIC | Facility: CLINIC | Age: 58
End: 2018-07-11

## 2018-07-11 NOTE — TELEPHONE ENCOUNTER
Called patient back  Instructed her to go ahead and schedule an ultrasound and continue omeprazole  Her pain is the same, reviewed negative ct result   Follow up with Dr Haris Wagner

## 2018-07-13 ENCOUNTER — HOSPITAL ENCOUNTER (OUTPATIENT)
Dept: ULTRASOUND IMAGING | Facility: HOSPITAL | Age: 58
Discharge: HOME | End: 2018-07-13
Payer: COMMERCIAL

## 2018-07-13 DIAGNOSIS — R10.13 EPIGASTRIC PAIN: ICD-10-CM

## 2018-07-13 DIAGNOSIS — R07.89 CHEST DISCOMFORT: ICD-10-CM

## 2018-07-13 PROCEDURE — 76705 ECHO EXAM OF ABDOMEN: CPT

## 2018-12-25 DIAGNOSIS — E05.90 HYPERTHYROIDISM: ICD-10-CM

## 2018-12-25 RX ORDER — LEVOTHYROXINE SODIUM 112 UG/1
TABLET ORAL
Qty: 90 TABLET | Refills: 2 | Status: SHIPPED | OUTPATIENT
Start: 2018-12-25 | End: 2019-09-23 | Stop reason: SDUPTHER

## 2019-07-17 ENCOUNTER — TELEPHONE (OUTPATIENT)
Dept: INTERNAL MEDICINE CLINIC | Facility: CLINIC | Age: 59
End: 2019-07-17

## 2019-07-17 NOTE — TELEPHONE ENCOUNTER
Pt called in to reschedule her wellness visit however she stated she is having cramping/pain on the left ovary side - started approx 3 weeks ago    No fevers, no bleeding - her last pap was normal     I scheduled her for tomorrow morning at 8:45am for a same day appt to discuss current symptoms however she will need a referral to GYN as we no longer do paps and she is also due for her yearly wellness; I advised we may not be able to do both at [address] visit but we could schedule her at checkout for the wellness if we are not able to  Hedy RILEY

## 2019-07-18 ENCOUNTER — OFFICE VISIT (OUTPATIENT)
Dept: INTERNAL MEDICINE CLINIC | Facility: CLINIC | Age: 59
End: 2019-07-18
Payer: COMMERCIAL

## 2019-07-18 VITALS
DIASTOLIC BLOOD PRESSURE: 62 MMHG | WEIGHT: 138.8 LBS | HEART RATE: 72 BPM | BODY MASS INDEX: 23.13 KG/M2 | OXYGEN SATURATION: 96 % | HEIGHT: 65 IN | SYSTOLIC BLOOD PRESSURE: 106 MMHG

## 2019-07-18 DIAGNOSIS — R10.2 PELVIC PAIN: Primary | ICD-10-CM

## 2019-07-18 DIAGNOSIS — C50.912 MALIGNANT NEOPLASM OF LEFT BREAST IN FEMALE, ESTROGEN RECEPTOR NEGATIVE, UNSPECIFIED SITE OF BREAST (HCC): ICD-10-CM

## 2019-07-18 DIAGNOSIS — E03.9 HYPOTHYROIDISM, UNSPECIFIED TYPE: ICD-10-CM

## 2019-07-18 DIAGNOSIS — Z17.1 MALIGNANT NEOPLASM OF LEFT BREAST IN FEMALE, ESTROGEN RECEPTOR NEGATIVE, UNSPECIFIED SITE OF BREAST (HCC): ICD-10-CM

## 2019-07-18 PROBLEM — I89.0 LYMPHEDEMA OF LEFT ARM: Status: ACTIVE | Noted: 2017-05-19

## 2019-07-18 PROCEDURE — 99214 OFFICE O/P EST MOD 30 MIN: CPT | Performed by: INTERNAL MEDICINE

## 2019-07-18 PROCEDURE — 1036F TOBACCO NON-USER: CPT | Performed by: INTERNAL MEDICINE

## 2019-07-18 PROCEDURE — 3008F BODY MASS INDEX DOCD: CPT | Performed by: INTERNAL MEDICINE

## 2019-07-18 RX ORDER — METOPROLOL SUCCINATE 25 MG/1
25 TABLET, EXTENDED RELEASE ORAL DAILY
COMMUNITY
Start: 2019-04-15 | End: 2019-07-18 | Stop reason: ALTCHOICE

## 2019-07-18 NOTE — PROGRESS NOTES
Assessment/Plan:  Obtain US and schedule with gyn  Schedule wellness visit anytime     Problem List Items Addressed This Visit        Endocrine    Hypothyroidism     Slightly elevated TSH in [place]  Take levothyroxine daily            Other    Malignant neoplasm of left breast in female, estrogen receptor negative ([address] )     Up to date with mammograms, continues to see her oncologist            Other Visit Diagnoses     Pelvic pain    -  Primary    Relevant Orders    Ambulatory referral to Obstetrics / Gynecology    US pelvis complete w transvaginal            Subjective:      Patient ID: Ayush Gruber Parent is a 66 y o  female  HPI  A few weeks of dull left sided pelvic pain 2/10 non radiating  Denies changes in her bowels, blood in her stool  Appetite is good and her weight has been stable  Denies urinary difficulties, no burning, hematuria, urgency  Denies vaginal bleeding or discharge  Normal colonoscopy last year  Personal h/o breast cancer  2 sister with breast cancer and 1 with ovarian cancer  lst pap was in Dec 2017 and was normal    The following portions of the patient's history were reviewed and updated as appropriate: allergies, current medications, past family history, past medical history, past social history and problem list     Review of Systems   Constitutional: Negative for fatigue, fever and unexpected weight change  HENT: Negative for ear pain, hearing loss, sinus pressure, sinus pain and sore throat  Respiratory: Negative for cough, shortness of breath and wheezing  Cardiovascular: Negative for chest pain, palpitations and leg swelling  Gastrointestinal: Negative for abdominal pain, constipation, diarrhea, nausea and vomiting  Genitourinary: Positive for pelvic pain  Negative for difficulty urinating, dysuria, frequency, hematuria, vaginal bleeding and vaginal discharge           Objective:      /62   Pulse 72   Ht 5' 5" (1 651 m)   Wt 63 kg (138 lb 12 8 oz)   SpO2 96%   BMI 23 10 kg/m²          Physical Exam   Constitutional: She is oriented to person, place, and time  She appears well-developed and well-nourished  HENT:   Head: Normocephalic and atraumatic  Right Ear: External ear normal    Left Ear: External ear normal    Mouth/Throat: Oropharynx is clear and moist    Eyes: Conjunctivae are normal    Neck: Neck supple  Cardiovascular: Normal rate, regular rhythm and normal heart sounds  No murmur heard  Pulmonary/Chest: Effort normal and breath sounds normal  No respiratory distress  She has no wheezes  She has no rales  Abdominal: Soft  Bowel sounds are normal  She exhibits no distension and no mass  There is no tenderness  There is no rebound and no guarding  Musculoskeletal: Normal range of motion  Neurological: She is alert and oriented to person, place, and time  Skin: Skin is warm and dry  Psychiatric: She has a normal mood and affect   Her behavior is normal  Judgment and thought content normal

## 2019-09-23 DIAGNOSIS — E05.90 HYPERTHYROIDISM: ICD-10-CM

## 2019-09-23 RX ORDER — LEVOTHYROXINE SODIUM 112 UG/1
TABLET ORAL
Qty: 90 TABLET | Refills: 4 | Status: SHIPPED | OUTPATIENT
Start: 2019-09-23 | End: 2020-09-22

## 2019-11-05 ENCOUNTER — APPOINTMENT (EMERGENCY)
Dept: CT IMAGING | Facility: HOSPITAL | Age: 59
End: 2019-11-05
Payer: COMMERCIAL

## 2019-11-05 ENCOUNTER — HOSPITAL ENCOUNTER (EMERGENCY)
Facility: HOSPITAL | Age: 59
Discharge: HOME | End: 2019-11-05
Attending: EMERGENCY MEDICINE
Payer: COMMERCIAL

## 2019-11-05 VITALS
HEIGHT: 65 IN | BODY MASS INDEX: 24.02 KG/M2 | SYSTOLIC BLOOD PRESSURE: 94 MMHG | HEART RATE: 57 BPM | TEMPERATURE: 97.9 F | WEIGHT: 144.18 LBS | OXYGEN SATURATION: 99 % | RESPIRATION RATE: 20 BRPM | DIASTOLIC BLOOD PRESSURE: 57 MMHG

## 2019-11-05 DIAGNOSIS — K52.9 GASTROENTERITIS: Primary | ICD-10-CM

## 2019-11-05 LAB
ALBUMIN SERPL BCP-MCNC: 4.4 G/DL (ref 3.5–5)
ALP SERPL-CCNC: 62 U/L (ref 46–116)
ALT SERPL W P-5'-P-CCNC: 24 U/L (ref 12–78)
ANION GAP SERPL CALCULATED.3IONS-SCNC: 8 MMOL/L (ref 4–13)
AST SERPL W P-5'-P-CCNC: 17 U/L (ref 5–45)
ATRIAL RATE: 50 BPM
BASOPHILS # BLD AUTO: 0.02 THOUSANDS/ΜL (ref 0–0.1)
BASOPHILS NFR BLD AUTO: 0 % (ref 0–1)
BILIRUB SERPL-MCNC: 0.8 MG/DL (ref 0.2–1)
BUN SERPL-MCNC: 26 MG/DL (ref 5–25)
CALCIUM SERPL-MCNC: 9.5 MG/DL (ref 8.3–10.1)
CHLORIDE SERPL-SCNC: 100 MMOL/L (ref 100–108)
CO2 SERPL-SCNC: 28 MMOL/L (ref 21–32)
CREAT SERPL-MCNC: 1.06 MG/DL (ref 0.6–1.3)
EOSINOPHIL # BLD AUTO: 0 THOUSAND/ΜL (ref 0–0.61)
EOSINOPHIL NFR BLD AUTO: 0 % (ref 0–6)
ERYTHROCYTE [DISTWIDTH] IN BLOOD BY AUTOMATED COUNT: 13.2 % (ref 11.6–15.1)
GFR SERPL CREATININE-BSD FRML MDRD: 58 ML/MIN/1.73SQ M
GLUCOSE SERPL-MCNC: 123 MG/DL (ref 65–140)
HCT VFR BLD AUTO: 35.9 % (ref 34.8–46.1)
HGB BLD-MCNC: 11.8 G/DL (ref 11.5–15.4)
IMM GRANULOCYTES # BLD AUTO: 0.06 THOUSAND/UL (ref 0–0.2)
IMM GRANULOCYTES NFR BLD AUTO: 1 % (ref 0–2)
LIPASE SERPL-CCNC: 79 U/L (ref 73–393)
LYMPHOCYTES # BLD AUTO: 0.44 THOUSANDS/ΜL (ref 0.6–4.47)
LYMPHOCYTES NFR BLD AUTO: 4 % (ref 14–44)
MCH RBC QN AUTO: 30.6 PG (ref 26.8–34.3)
MCHC RBC AUTO-ENTMCNC: 32.9 G/DL (ref 31.4–37.4)
MCV RBC AUTO: 93 FL (ref 82–98)
MONOCYTES # BLD AUTO: 0.7 THOUSAND/ΜL (ref 0.17–1.22)
MONOCYTES NFR BLD AUTO: 6 % (ref 4–12)
NEUTROPHILS # BLD AUTO: 11.35 THOUSANDS/ΜL (ref 1.85–7.62)
NEUTS SEG NFR BLD AUTO: 89 % (ref 43–75)
NRBC BLD AUTO-RTO: 0 /100 WBCS
P AXIS: 73 DEGREES
PLATELET # BLD AUTO: 177 THOUSANDS/UL (ref 149–390)
PMV BLD AUTO: 10.4 FL (ref 8.9–12.7)
POTASSIUM SERPL-SCNC: 3.8 MMOL/L (ref 3.5–5.3)
PR INTERVAL: 166 MS
PROT SERPL-MCNC: 8.4 G/DL (ref 6.4–8.2)
QRS AXIS: 88 DEGREES
QRSD INTERVAL: 78 MS
QT INTERVAL: 460 MS
QTC INTERVAL: 419 MS
RBC # BLD AUTO: 3.86 MILLION/UL (ref 3.81–5.12)
SODIUM SERPL-SCNC: 136 MMOL/L (ref 136–145)
T WAVE AXIS: 65 DEGREES
TROPONIN I SERPL-MCNC: <0.02 NG/ML
VENTRICULAR RATE: 50 BPM
WBC # BLD AUTO: 12.57 THOUSAND/UL (ref 4.31–10.16)

## 2019-11-05 PROCEDURE — 84484 ASSAY OF TROPONIN QUANT: CPT | Performed by: PHYSICIAN ASSISTANT

## 2019-11-05 PROCEDURE — 36415 COLL VENOUS BLD VENIPUNCTURE: CPT | Performed by: PHYSICIAN ASSISTANT

## 2019-11-05 PROCEDURE — 99284 EMERGENCY DEPT VISIT MOD MDM: CPT

## 2019-11-05 PROCEDURE — 85025 COMPLETE CBC W/AUTO DIFF WBC: CPT | Performed by: PHYSICIAN ASSISTANT

## 2019-11-05 PROCEDURE — 74177 CT ABD & PELVIS W/CONTRAST: CPT

## 2019-11-05 PROCEDURE — 99284 EMERGENCY DEPT VISIT MOD MDM: CPT | Performed by: PHYSICIAN ASSISTANT

## 2019-11-05 PROCEDURE — 93005 ELECTROCARDIOGRAM TRACING: CPT

## 2019-11-05 PROCEDURE — 96361 HYDRATE IV INFUSION ADD-ON: CPT

## 2019-11-05 PROCEDURE — 93010 ELECTROCARDIOGRAM REPORT: CPT | Performed by: INTERNAL MEDICINE

## 2019-11-05 PROCEDURE — 96374 THER/PROPH/DIAG INJ IV PUSH: CPT

## 2019-11-05 PROCEDURE — 80053 COMPREHEN METABOLIC PANEL: CPT | Performed by: PHYSICIAN ASSISTANT

## 2019-11-05 PROCEDURE — 96375 TX/PRO/DX INJ NEW DRUG ADDON: CPT

## 2019-11-05 PROCEDURE — 83690 ASSAY OF LIPASE: CPT | Performed by: PHYSICIAN ASSISTANT

## 2019-11-05 RX ORDER — KETOROLAC TROMETHAMINE 30 MG/ML
15 INJECTION, SOLUTION INTRAMUSCULAR; INTRAVENOUS ONCE
Status: COMPLETED | OUTPATIENT
Start: 2019-11-05 | End: 2019-11-05

## 2019-11-05 RX ORDER — ONDANSETRON 2 MG/ML
4 INJECTION INTRAMUSCULAR; INTRAVENOUS ONCE
Status: COMPLETED | OUTPATIENT
Start: 2019-11-05 | End: 2019-11-05

## 2019-11-05 RX ORDER — ONDANSETRON 4 MG/1
4 TABLET, ORALLY DISINTEGRATING ORAL EVERY 6 HOURS PRN
Qty: 20 TABLET | Refills: 0 | Status: SHIPPED | OUTPATIENT
Start: 2019-11-05 | End: 2020-07-07

## 2019-11-05 RX ADMIN — ONDANSETRON 4 MG: 2 INJECTION INTRAMUSCULAR; INTRAVENOUS at 05:07

## 2019-11-05 RX ADMIN — KETOROLAC TROMETHAMINE 15 MG: 30 INJECTION, SOLUTION INTRAMUSCULAR at 05:07

## 2019-11-05 RX ADMIN — SODIUM CHLORIDE 1000 ML: 0.9 INJECTION, SOLUTION INTRAVENOUS at 05:18

## 2019-11-05 RX ADMIN — IOHEXOL 100 ML: 350 INJECTION, SOLUTION INTRAVENOUS at 06:40

## 2019-11-06 NOTE — ED PROVIDER NOTES
History  Chief Complaint   Patient presents with    Vomiting     nausea and vomiting since 2300; awoke from sleep to pain; no relief with vomiting  Patient is a 53-year-old female that presents emergency department with complaints of nausea and vomiting that began at 11 o'clock last night  Patient states she has vomited several times  Patient denies any fevers, chills  Patient states that she had diarrhea prior to the vomiting  Patient states that she has some abdominal pain  Prior to Admission Medications   Prescriptions Last Dose Informant Patient Reported?  Taking?   levothyroxine 112 mcg tablet 11/4/2019 at Unknown time  No Yes   Sig: TAKE 1 TABLET DAILY      Facility-Administered Medications: None       Past Medical History:   Diagnosis Date    Atypical chest pain     LAST ASSESSED: 6/12/14    Basal cell carcinoma     Breast cancer ([facility] [address] )     LAST ASSESSED: 3/5/14    Chest tightness or pressure     LAST ASSESSED: 7/7/15    Ductal carcinoma in situ of breast     SOLID TYPE; LAST ASSESSED: 12/23/13    Herpes zoster     LAST ASSESSED: 12/23/13    Hypothyroidism     Lump of skin     LAST ASSESSED: 9/4/13    Lymphadenopathy     LAST ASSESSED: 12/23/13    Neck strain     LAST ASSESSED: 2/17/15    Other cervical disc degeneration at C5-C6 level     Palpitations     LAST ASSESSED: 7/7/15    Tachycardia        Past Surgical History:   Procedure Laterality Date    BREAST LUMPECTOMY      BREAST LUMPECTOMY Left     INCISIONAL BREAST BIOPSY      MAMMO STEREOTACTIC BREAST BIOPSY LEFT (ALL INC) Left 9/9/2016    NOSE SURGERY         Family History   Problem Relation Age of Onset    Leukemia Father     Heart failure Father     Breast cancer Sister         LISTED 3X IN ALLSCRIPTS    Cancer Sister         CARCINOMA OF THE ANUS    Ovarian cancer Sister         LISTED 2X IN ALLSCRIPTS    Lymphoma Brother         NON-HODKIN'S    Colon cancer Family     Skin cancer Family         MALIGNANT MELANOMA OF THE SKIN    No Known Problems Mother      I have reviewed and agree with the history as documented  Social History     Tobacco Use    Smoking status: Never Smoker    Smokeless tobacco: Never Used   Substance Use Topics    Alcohol use: Not Currently     Comment: BEING A SOCIAL DRINKER    Drug use: Not Currently        Review of Systems   Constitutional: Negative for fever  Respiratory: Negative for shortness of breath  Cardiovascular: Negative for chest pain  Gastrointestinal: Positive for abdominal pain, diarrhea, nausea and vomiting  All other systems reviewed and are negative  Physical Exam  Physical Exam   Constitutional: She is oriented to person, place, and time  She appears well-developed and well-nourished  HENT:   Head: Normocephalic and atraumatic  Right Ear: External ear normal    Left Ear: External ear normal    Nose: Nose normal    Mouth/Throat: Oropharynx is clear and moist    Eyes: Pupils are equal, round, and reactive to light  Conjunctivae and EOM are normal    Neck: Normal range of motion  Cardiovascular: Normal rate, regular rhythm and normal heart sounds  Pulmonary/Chest: Effort normal and breath sounds normal    Abdominal: Soft  Bowel sounds are normal  There is tenderness in the epigastric area  There is no rebound and no CVA tenderness  Neurological: She is alert and oriented to person, place, and time  Skin: Skin is warm  Psychiatric: She has a normal mood and affect  Her behavior is normal  Judgment and thought content normal    Vitals reviewed        Vital Signs  ED Triage Vitals [11/05/19 0441]   Temperature Pulse Respirations Blood Pressure SpO2   97 9 °F (36 6 °C) 69 20 97/82 99 %      Temp Source Heart Rate Source Patient Position - Orthostatic VS BP Location FiO2 (%)   Oral Monitor Lying Right arm --      Pain Score       8           Vitals:    11/05/19 0441 11/05/19 0600   BP: 97/82 94/57   Pulse: 69 57   Patient Position - Orthostatic VS: Lying Lying         Visual Acuity      ED Medications  Medications   sodium chloride 0 9 % bolus 1,000 mL (0 mL Intravenous Stopped 11/5/19 0727)   ondansetron (ZOFRAN) injection 4 mg (4 mg Intravenous Given 11/5/19 0507)   ketorolac (TORADOL) injection 15 mg (15 mg Intravenous Given 11/5/19 0507)   iohexol (OMNIPAQUE) 350 MG/ML injection (MULTI-DOSE) 100 mL (100 mL Intravenous Given 11/5/19 0640)       Diagnostic Studies  Results Reviewed     Procedure Component Value Units Date/Time    Comprehensive metabolic panel [[id number]]  (Abnormal) Collected:  11/05/19 0608    Lab Status:  Final result Specimen:  Blood from Arm, Right Updated:  11/05/19 0637     Sodium 136 mmol/L      Potassium 3 8 mmol/L      Chloride 100 mmol/L      CO2 28 mmol/L      ANION GAP 8 mmol/L      BUN 26 mg/dL      Creatinine 1 06 mg/dL      Glucose 123 mg/dL      Calcium 9 5 mg/dL      AST 17 U/L      ALT 24 U/L      Alkaline Phosphatase 62 U/L      Total Protein 8 4 g/dL      Albumin 4 4 g/dL      Total Bilirubin 0 80 mg/dL      eGFR 58 ml/min/1 73sq m     Narrative:       [place] guidelines for Chronic Kidney Disease (CKD):     Stage 1 with normal or high GFR (GFR > 90 mL/min/1 73 square meters)    Stage 2 Mild CKD (GFR = 60-89 mL/min/1 73 square meters)    Stage 3A Moderate CKD (GFR = 45-59 mL/min/1 73 square meters)    Stage 3B Moderate CKD (GFR = 30-44 mL/min/1 73 square meters)    Stage 4 Severe CKD (GFR = 15-29 mL/min/1 73 square meters)    Stage 5 End Stage CKD (GFR <15 mL/min/1 73 square meters)  Note: GFR calculation is accurate only with a steady state creatinine    Lipase [[id number]]  (Normal) Collected:  11/05/19 0608    Lab Status:  Final result Specimen:  Blood from Arm, Right Updated:  11/05/19 0637     Lipase 79 u/L     Troponin I [[id number]]  (Normal) Collected:  11/05/19 0531    Lab Status:  Final result Specimen:  Blood from Arm, Right Updated:  11/05/19 0558     Troponin I <0 02 ng/mL CBC and differential [[id number]]  (Abnormal) Collected:  11/05/19 0531    Lab Status:  Final result Specimen:  Blood from Arm, Right Updated:  11/05/19 0542     WBC 12 57 Thousand/uL      RBC 3 86 Million/uL      Hemoglobin 11 8 g/dL      Hematocrit 35 9 %      MCV 93 fL      MCH 30 6 pg      MCHC 32 9 g/dL      RDW 13 2 %      MPV 10 4 fL      Platelets 462 Thousands/uL      nRBC 0 /100 WBCs      Neutrophils Relative 89 %      Immat GRANS % 1 %      Lymphocytes Relative 4 %      Monocytes Relative 6 %      Eosinophils Relative 0 %      Basophils Relative 0 %      Neutrophils Absolute 11 35 Thousands/µL      Immature Grans Absolute 0 06 Thousand/uL      Lymphocytes Absolute 0 44 Thousands/µL      Monocytes Absolute 0 70 Thousand/µL      Eosinophils Absolute 0 00 Thousand/µL      Basophils Absolute 0 02 Thousands/µL                  CT abdomen pelvis w contrast   Final Result by Britni Dominguez MD (11/05 0700)      No acute findings  Normal appendix identified  Workstation performed: [id number]                    Procedures  Procedures       ED Course                               MDM  Number of Diagnoses or Management Options  Gastroenteritis:   Diagnosis management comments: Patient is a 79-year-old female presents emergency department complaints of nausea and vomiting the evaluations arm CT pelvis does not show any acute abnormality  Patient had improvement in symptoms with IV fluids and IV Zofran  History and physical exam is consistent gastroenteritis  Patient is given prescription for Zofran  I encouraged adequate fluid intake  Return parameters were discussed  Patient stable for discharge         Amount and/or Complexity of Data Reviewed  Clinical lab tests: ordered and reviewed  Tests in the radiology section of CPT®: ordered and reviewed    Risk of Complications, Morbidity, and/or Mortality  Presenting problems: moderate  Diagnostic procedures: moderate  Management options: moderate    Patient Progress  Patient progress: stable      Disposition  Final diagnoses:   Gastroenteritis     Time reflects when diagnosis was documented in both MDM as applicable and the Disposition within this note     Time User Action Codes Description Comment    11/5/2019  7:08 AM Anastasia Jones Add [K52 9] Gastroenteritis       ED Disposition     ED Disposition Condition Date/Time Comment    Discharge Good Tue Nov 5, 2019  7:08 AM Poncho Reece Parent discharge to home/self care  Follow-up Information     Follow up With Specialties Details Why Contact Info Additional Information    Kiah Silva MD Internal Medicine Schedule an appointment as soon as possible for a visit  If symptoms worsen [address]        [facility] Emergency Department Emergency Medicine  If symptoms worsen [address] [address] ED, [address] [place] [address], [facility]          Discharge Medication List as of 11/5/2019  7:08 AM      START taking these medications    Details   ondansetron (ZOFRAN-ODT) 4 mg disintegrating tablet Take 1 tablet (4 mg total) by mouth every 6 (six) hours as needed for nausea, Starting Tue 11/5/2019, Print         CONTINUE these medications which have NOT CHANGED    Details   levothyroxine 112 mcg tablet TAKE 1 TABLET DAILY, Normal           No discharge procedures on file      ED Provider  Electronically Signed by           Roseline Perez PA-C  11/05/19 6348

## 2019-11-27 ENCOUNTER — TELEPHONE (OUTPATIENT)
Dept: INTERNAL MEDICINE CLINIC | Facility: CLINIC | Age: 59
End: 2019-11-27

## 2019-11-27 DIAGNOSIS — E03.9 HYPOTHYROIDISM, UNSPECIFIED TYPE: Primary | ICD-10-CM

## 2019-11-27 NOTE — TELEPHONE ENCOUNTER
Patient had labs complete for her Oncologist and she was told her levels are off so she asked if you would review the labs and give your recommendations? She said you have been following her thyroid      Please advise

## 2019-11-27 NOTE — TELEPHONE ENCOUNTER
Before we increase the dose, she should take it daily and recheck in 4 weeks  If it remains high, will increase the dose  Order entered

## 2019-12-17 ENCOUNTER — TELEPHONE (OUTPATIENT)
Dept: INTERNAL MEDICINE CLINIC | Facility: CLINIC | Age: 59
End: 2019-12-17

## 2019-12-17 DIAGNOSIS — E03.9 HYPOTHYROIDISM, UNSPECIFIED TYPE: Primary | ICD-10-CM

## 2019-12-17 NOTE — TELEPHONE ENCOUNTER
The patient was eval by cards who said that she had a  irregular heartbeat it could be from stress or a irregular heartbeat  The pt was to take a medication for the irregular heartbeat  The patient chose to wait to take the medication to see if the irregular heartbeat would decrease if she was taking her Levothyroxine on a regular basis  The patient was then eval by hematology and was told her  TSH was elevated by her hematologist       The pt was told to take her levothyroxine daily  She is taking it 30 minutes before eating or drinking  The patient reports that she feels worse now than before  Her irregular heartbeat has increased she has increased fatigue  Prior to this she was taking Synthroid not Levothyroxine  She is wondering if this is why she is feeling like she is and what should she do

## 2019-12-17 NOTE — TELEPHONE ENCOUNTER
I spoke with the pt she will have a TSH done at [facility] around 6 pm or tomorrow morning

## 2019-12-18 ENCOUNTER — APPOINTMENT (OUTPATIENT)
Dept: LAB | Facility: CLINIC | Age: 59
End: 2019-12-18
Payer: COMMERCIAL

## 2019-12-18 LAB — TSH SERPL DL<=0.05 MIU/L-ACNC: 2.34 UIU/ML (ref 0.36–3.74)

## 2019-12-18 PROCEDURE — 36415 COLL VENOUS BLD VENIPUNCTURE: CPT | Performed by: INTERNAL MEDICINE

## 2019-12-18 PROCEDURE — 84443 ASSAY THYROID STIM HORMONE: CPT | Performed by: INTERNAL MEDICINE

## 2019-12-20 ENCOUNTER — OFFICE VISIT (OUTPATIENT)
Dept: INTERNAL MEDICINE CLINIC | Facility: CLINIC | Age: 59
End: 2019-12-20
Payer: COMMERCIAL

## 2019-12-20 VITALS
HEART RATE: 78 BPM | BODY MASS INDEX: 23.36 KG/M2 | OXYGEN SATURATION: 98 % | HEIGHT: 65 IN | SYSTOLIC BLOOD PRESSURE: 100 MMHG | DIASTOLIC BLOOD PRESSURE: 62 MMHG | WEIGHT: 140.2 LBS

## 2019-12-20 DIAGNOSIS — I47.1 ATRIAL TACHYCARDIA (HCC): Primary | ICD-10-CM

## 2019-12-20 DIAGNOSIS — E03.9 HYPOTHYROIDISM, UNSPECIFIED TYPE: ICD-10-CM

## 2019-12-20 PROBLEM — K52.9 GASTROENTERITIS: Status: RESOLVED | Noted: 2019-11-05 | Resolved: 2019-12-20

## 2019-12-20 PROCEDURE — 3008F BODY MASS INDEX DOCD: CPT | Performed by: INTERNAL MEDICINE

## 2019-12-20 PROCEDURE — 99214 OFFICE O/P EST MOD 30 MIN: CPT | Performed by: INTERNAL MEDICINE

## 2019-12-20 PROCEDURE — 1036F TOBACCO NON-USER: CPT | Performed by: INTERNAL MEDICINE

## 2019-12-20 PROCEDURE — 93000 ELECTROCARDIOGRAM COMPLETE: CPT | Performed by: INTERNAL MEDICINE

## 2019-12-20 RX ORDER — METOPROLOL SUCCINATE 25 MG/1
12.5 TABLET, EXTENDED RELEASE ORAL DAILY
Qty: 45 TABLET | Refills: 1 | Status: SHIPPED | OUTPATIENT
Start: 2019-12-20 | End: 2022-04-19

## 2019-12-20 NOTE — PROGRESS NOTES
Assessment/Plan:  Willing to start metoprolol at this point  Her BP runs low as it is  We spoke about liberal fluid intake and no salt restriction  F/U with cardiology     Problem List Items Addressed This Visit        Endocrine    Hypothyroidism    Relevant Medications    metoprolol succinate (TOPROL-XL) 25 mg 24 hr tablet    Other Relevant Orders    TSH, 3rd generation with Free T4 reflex    Comprehensive metabolic panel    Lipid panel      Other Visit Diagnoses     Atrial tachycardia (HCC)    -  Primary    Relevant Medications    metoprolol succinate (TOPROL-XL) 25 mg 24 hr tablet    Other Relevant Orders    TSH, 3rd generation with Free T4 reflex    Comprehensive metabolic panel    Lipid panel    POCT ECG (Completed)            Subjective:      Patient ID: Zohaib Lee Parent is a 64 y o  female  HPI  Long h/o palpitations  Saw cardiology in April Holter showed    "1  Predominant rhythm is sinus rhythm  2  Rare atrial and ventricular ectopy  3  Multiple episodes of SVT which appear more consistent with atrial  tachycardia  4  Palpitations correspond with normal sinus rhythm on one episode and  multiple episodes of SVT at ~120bpm that appear to be atrial tachycardia"    She was prescribed metoprolol XL 12 5mg but did not start it  She wanted to try lifestyle changes and switched jobs  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, reaching out to alumni  Palpitations seem to be more frequent, last longer   She denies chest pain, SOB, dizziness  BP runs low  She is active, exercises regularly and sometimes experiences them  She drinks 2 mugs of coffee daily  She takes levothyroxine daily now and TSH is normal      The following portions of the patient's history were reviewed and updated as appropriate: current medications, past family history, past medical history, past social history, past surgical history and problem list     Review of Systems   Constitutional: Negative for chills, fatigue, fever and unexpected weight change  HENT: Negative for ear pain, sinus pressure and sore throat  Respiratory: Negative for cough, shortness of breath and wheezing  Cardiovascular: Positive for palpitations  Negative for chest pain and leg swelling  Gastrointestinal: Negative for abdominal pain, constipation, diarrhea, nausea and vomiting  Genitourinary: Positive for pelvic pain (occasional, did not get the US recommended  Scheduled to see gyn)  Negative for difficulty urinating, vaginal bleeding and vaginal discharge  Musculoskeletal: Negative for arthralgias and myalgias  Neurological: Negative for dizziness and headaches  Objective:      /62   Pulse 78   Ht 5' 5" (1 651 m)   Wt 63 6 kg (140 lb 3 2 oz)   SpO2 98%   BMI 23 33 kg/m²          Physical Exam   Constitutional: She is oriented to person, place, and time  She appears well-developed and well-nourished  HENT:   Head: Normocephalic and atraumatic  Right Ear: External ear normal    Left Ear: External ear normal    Mouth/Throat: Oropharynx is clear and moist    Eyes: Conjunctivae are normal    Neck: Neck supple  Cardiovascular: Normal rate, regular rhythm and normal heart sounds  No murmur heard  Pulmonary/Chest: Effort normal and breath sounds normal  No respiratory distress  She has no wheezes  She has no rales  Abdominal: Soft  She exhibits no distension and no mass  There is no tenderness  There is no rebound and no guarding  Neurological: She is alert and oriented to person, place, and time  Skin: Skin is warm and dry  Psychiatric: She has a normal mood and affect   Her behavior is normal  Judgment and thought content normal

## 2020-01-06 ENCOUNTER — ANNUAL EXAM (OUTPATIENT)
Dept: OBGYN CLINIC | Facility: CLINIC | Age: 60
End: 2020-01-06
Payer: COMMERCIAL

## 2020-01-06 VITALS
HEIGHT: 65 IN | SYSTOLIC BLOOD PRESSURE: 116 MMHG | DIASTOLIC BLOOD PRESSURE: 62 MMHG | WEIGHT: 141.4 LBS | BODY MASS INDEX: 23.56 KG/M2

## 2020-01-06 DIAGNOSIS — Z12.39 BREAST CANCER SCREENING: ICD-10-CM

## 2020-01-06 DIAGNOSIS — Z01.419 ENCOUNTER FOR ANNUAL ROUTINE GYNECOLOGICAL EXAMINATION: Primary | ICD-10-CM

## 2020-01-06 PROCEDURE — 3008F BODY MASS INDEX DOCD: CPT | Performed by: INTERNAL MEDICINE

## 2020-01-06 PROCEDURE — S0610 ANNUAL GYNECOLOGICAL EXAMINA: HCPCS | Performed by: OBSTETRICS & GYNECOLOGY

## 2020-01-06 PROCEDURE — G0145 SCR C/V CYTO,THINLAYER,RESCR: HCPCS | Performed by: OBSTETRICS & GYNECOLOGY

## 2020-01-06 PROCEDURE — 87624 HPV HI-RISK TYP POOLED RSLT: CPT | Performed by: OBSTETRICS & GYNECOLOGY

## 2020-01-06 NOTE — PROGRESS NOTES
Assessment/Plan:  Pap smear done as well as annual   Encouraged self-breast examination as well as calcium supplementation  Continue annual 3D mammogram   She will continue to follow-up with her oncologist and radiation oncologist as scheduled  She also follows up with cardiology  Continue colonoscopy every 5 years as scheduled  Return to office in 1 year or p r n  No problem-specific Assessment & Plan notes found for this encounter  Diagnoses and all orders for this visit:    Encounter for annual routine gynecological examination  -     Liquid-based pap, screening    Breast cancer screening  -     Mammo screening bilateral w 3d & cad; Future          Subjective:      Patient ID: Rojelio Beckwith Parent is a 64 y o  female  HPI     This is a 53-year-old female  ( x3, age 27, 23, 24) presents for annual gyn exam   She states for the last 8 years her family physician has been doing her pelvic examinations  Patient was diagnosed with DCIS left breast at age 54, treated with right lumpectomy  She subsequently was diagnosed with stage I left breast cancer at age 49 in treated with left lumpectomy, chemotherapy with Herceptin, followed by radiation  It was at this time that her menstrual cycles stopped  She denies any vaginal bleeding or spotting  There has been no changes in bowel or bladder function  Her last colonoscopy 2018 within normal limits with follow-up in 5 years  Patient underwent genetic testing, which was negative  Patient has been in a monogamous relationship with her  for over 27 years  Her Pap smears have been normal   There is a strong family history of ovarian cancer with her sister, stage IV, in remission for the last 6 years  She has had 2 other sisters with breast cancer  More recently her mother was diagnosed at the age of 80 with stage IV breast cancer  Patient was complaining of palpitations  She is now seeing a cardiologist and was placed on a beta blocker    She continues to follow-up with her family physician and states that hypothyroidism is well controlled  The following portions of the patient's history were reviewed and updated as appropriate: allergies, current medications, past family history, past medical history, past social history, past surgical history and problem list     Review of Systems   Constitutional: Negative for fatigue, fever and unexpected weight change  Respiratory: Negative for cough, chest tightness, shortness of breath and wheezing  Cardiovascular: Negative  Negative for chest pain and palpitations  Gastrointestinal: Negative  Negative for abdominal distention, abdominal pain, blood in stool, constipation, diarrhea, nausea and vomiting  Genitourinary: Negative  Negative for difficulty urinating, dyspareunia, dysuria, flank pain, frequency, genital sores, hematuria, pelvic pain, urgency, vaginal bleeding, vaginal discharge and vaginal pain  Skin: Negative for rash  Objective:      /62   Ht 5' 5" (1 651 m)   Wt 64 1 kg (141 lb 6 4 oz)   Breastfeeding? No   BMI 23 53 kg/m²          Physical Exam   Constitutional: She appears well-developed and well-nourished  Cardiovascular: Normal rate and regular rhythm  Pulmonary/Chest: Effort normal and breath sounds normal  Right breast exhibits no inverted nipple, no mass, no nipple discharge, no skin change and no tenderness  Left breast exhibits no inverted nipple, no mass, no nipple discharge, no skin change and no tenderness  Scar noted bilateral breasts, status post lumpectomy   Abdominal: Soft  Bowel sounds are normal  She exhibits no distension  There is no tenderness  There is no rebound and no guarding  Genitourinary: Vagina normal and uterus normal  There is no lesion on the right labia  There is no lesion on the left labia  Cervix exhibits no discharge and no friability  Right adnexum displays no mass, no tenderness and no fullness   Left adnexum displays no mass, no tenderness and no fullness  No vaginal discharge found  Genitourinary Comments: The vagina is evident of estrogen deficiency  The cervix is small the os is closed  There is no evidence of pelvic floor prolapse  Rectovaginal exam is confirmatory

## 2020-01-08 LAB
HPV HR 12 DNA CVX QL NAA+PROBE: NEGATIVE
HPV16 DNA CVX QL NAA+PROBE: NEGATIVE
HPV18 DNA CVX QL NAA+PROBE: NEGATIVE

## 2020-01-09 LAB
LAB AP GYN PRIMARY INTERPRETATION: NORMAL
Lab: NORMAL

## 2020-04-27 ENCOUNTER — TELEPHONE (OUTPATIENT)
Dept: OTHER | Facility: OTHER | Age: 60
End: 2020-04-27

## 2020-07-07 ENCOUNTER — TELEPHONE (OUTPATIENT)
Dept: OTHER | Facility: OTHER | Age: 60
End: 2020-07-07

## 2020-07-07 ENCOUNTER — TELEMEDICINE (OUTPATIENT)
Dept: INTERNAL MEDICINE CLINIC | Facility: CLINIC | Age: 60
End: 2020-07-07
Payer: COMMERCIAL

## 2020-07-07 DIAGNOSIS — K52.9 GASTROENTERITIS: Primary | ICD-10-CM

## 2020-07-07 PROCEDURE — 99214 OFFICE O/P EST MOD 30 MIN: CPT | Performed by: INTERNAL MEDICINE

## 2020-07-07 PROCEDURE — 1036F TOBACCO NON-USER: CPT | Performed by: INTERNAL MEDICINE

## 2020-07-07 RX ORDER — ONDANSETRON 8 MG/1
8 TABLET, ORALLY DISINTEGRATING ORAL EVERY 8 HOURS PRN
Qty: 30 TABLET | Refills: 0 | Status: SHIPPED | OUTPATIENT
Start: 2020-07-07 | End: 2022-04-19

## 2020-07-07 RX ORDER — DICYCLOMINE HCL 20 MG
20 TABLET ORAL EVERY 6 HOURS PRN
Qty: 30 TABLET | Refills: 0 | Status: SHIPPED | OUTPATIENT
Start: 2020-07-07 | End: 2022-04-19

## 2020-07-07 NOTE — PROGRESS NOTES
Virtual Regular Visit      Assessment/Plan:  Will try a higher dose of Zofran which she can take 4 hours after the last dose this morning  May start Bentyl as soon as she gets it from the pharmacy  May start Tylenol anytime  If she cannot hold the meds down, she will need to go to the ER for IV treatment, labs and imaging  I asked her  to let me know if she does not get better  Schedule RUQ US and blood work anytime  Problem List Items Addressed This Visit     None      Visit Diagnoses     Gastroenteritis    -  Primary    Relevant Medications    dicyclomine (BENTYL) 20 mg tablet    ondansetron (ZOFRAN-ODT) 8 mg disintegrating tablet    Other Relevant Orders    Lipase    CBC and differential    Comprehensive metabolic panel    US right upper quadrant               Reason for visit is   Chief Complaint   Patient presents with    Virtual Regular Visit        Encounter provider Natalie Nelson MD    Provider located at [address] [place] [address]      Recent Visits  No visits were found meeting these conditions  Showing recent visits within past 7 days and meeting all other requirements     Today's Visits  Date Type Provider Dept   07/07/20 Marky Thompson MD [facility]   Showing today's visits and meeting all other requirements     Future Appointments  Date Type Provider Dept   07/07/20 Marky Thompson MD [facility]   Showing future appointments within next 150 days and meeting all other requirements        The patient was identified by name and date of birth  Vasyl Cannon Parent was informed that this is a telemedicine visit and that the visit is being conducted through [address] [place] [address] and patient was informed that this is not a secure, HIPAA-complaint platform  She agrees to proceed     My office door was closed  No one else was in the room    She acknowledged consent and understanding of privacy and security of the video platform  The patient has agreed to participate and understands they can discontinue the visit at any time  Patient is aware this is a billable service  Subjective  Elias Valverde Parent is a 64 y o  female with nausea, vomiting, diarrhea         Yesterday at 1pm, started with abdominal pain followed by generalized body aches, watery diarrhea, nausea, and vomiting  Epigastric pain radiating to the back/shoulder blades  7/10  No fever, URI symptoms  No new meds, supplements  Just ate bran muffins that morning  No sick contacts  Similar episode in November where she was seen in the ER and received IVF, Toradol, and Zofran   CT and blood work were normal   Took SL Zofran 4mg left over from that ER visit , last dose 830 today         Past Medical History:   Diagnosis Date    Atypical chest pain     LAST ASSESSED: 6/12/14    Basal cell carcinoma     Breast cancer [facility] [address] )     LAST ASSESSED: 3/5/14    Breast cancer ([facility] [address] ) 2016    Treated lumpectomy, chemotherapy, Herceptin, radiation    Chest tightness or pressure     LAST ASSESSED: 7/7/15    Ductal carcinoma in situ of breast     SOLID TYPE; LAST ASSESSED: 12/23/13    Herpes zoster     LAST ASSESSED: 12/23/13    Hypothyroidism     Lump of skin     LAST ASSESSED: 9/4/13    Lymphadenopathy     LAST ASSESSED: 12/23/13    Neck strain     LAST ASSESSED: 2/17/15    Other cervical disc degeneration at C5-C6 level     Palpitations     LAST ASSESSED: 7/7/15    Tachycardia        Past Surgical History:   Procedure Laterality Date    BREAST LUMPECTOMY      BREAST LUMPECTOMY Left     INCISIONAL BREAST BIOPSY      MAMMO STEREOTACTIC BREAST BIOPSY LEFT (ALL INC) Left 9/9/2016    NOSE SURGERY         Current Outpatient Medications   Medication Sig Dispense Refill    dicyclomine (BENTYL) 20 mg tablet Take 1 tablet (20 mg total) by mouth every 6 (six) hours as needed (abdominal cramp) 30 tablet 0    levothyroxine 112 mcg tablet TAKE 1 TABLET DAILY 90 tablet 4    metoprolol succinate (TOPROL-XL) 25 mg 24 hr tablet Take 0 5 tablets (12 5 mg total) by mouth daily 45 tablet 1    ondansetron (ZOFRAN-ODT) 8 mg disintegrating tablet Take 1 tablet (8 mg total) by mouth every 8 (eight) hours as needed for nausea or vomiting 30 tablet 0     No current facility-administered medications for this visit  No Known Allergies    Review of Systems   Constitutional: Negative for fever  HENT: Negative for rhinorrhea  Respiratory: Negative for cough  Gastrointestinal: Positive for abdominal pain, diarrhea, nausea and vomiting  Musculoskeletal: Positive for arthralgias and myalgias  Video Exam    There were no vitals filed for this visit  Physical Exam   Constitutional: She appears ill  Pulmonary/Chest: No respiratory distress  Abdominal: There is tenderness (generalized)  Neurological: She is alert  As a result of this visit, I have not referred the patient for further respiratory evaluation  I spent 15 minutes directly with the patient during this visit      [id number] [address] Parent acknowledges that she has consented to an online visit or consultation  She understands that the online visit is based solely on information provided by her, and that, in the absence of a face-to-face physical evaluation by the physician, the diagnosis she receives is both limited and provisional in terms of accuracy and completeness  This is not intended to replace a full medical face-to-face evaluation by the physician  Tanna Chavez Parent understands and accepts these terms

## 2020-07-07 NOTE — TELEPHONE ENCOUNTER
Pt does not think it is Covid related but she has severe diarrhea, vomiting and stomach pain since 1300 yesterday   Pt would like to be seen today

## 2020-07-08 ENCOUNTER — HOSPITAL ENCOUNTER (OUTPATIENT)
Dept: ULTRASOUND IMAGING | Facility: CLINIC | Age: 60
Discharge: HOME | End: 2020-07-08
Payer: COMMERCIAL

## 2020-07-08 DIAGNOSIS — K52.9 GASTROENTERITIS: ICD-10-CM

## 2020-07-08 PROCEDURE — 76705 ECHO EXAM OF ABDOMEN: CPT

## 2020-09-22 DIAGNOSIS — E05.90 HYPERTHYROIDISM: ICD-10-CM

## 2020-09-22 RX ORDER — LEVOTHYROXINE SODIUM 112 UG/1
TABLET ORAL
Qty: 90 TABLET | Refills: 3 | Status: SHIPPED | OUTPATIENT
Start: 2020-09-22 | End: 2021-05-26 | Stop reason: SDUPTHER

## 2021-02-19 DIAGNOSIS — Z23 ENCOUNTER FOR IMMUNIZATION: ICD-10-CM

## 2021-05-26 DIAGNOSIS — E05.90 HYPERTHYROIDISM: ICD-10-CM

## 2021-05-27 RX ORDER — LEVOTHYROXINE SODIUM 112 UG/1
112 TABLET ORAL DAILY
Qty: 30 TABLET | Refills: 0 | Status: SHIPPED | OUTPATIENT
Start: 2021-05-27 | End: 2021-05-28 | Stop reason: DRUGHIGH

## 2021-05-28 ENCOUNTER — TELEPHONE (OUTPATIENT)
Dept: INTERNAL MEDICINE CLINIC | Facility: CLINIC | Age: 61
End: 2021-05-28

## 2021-05-28 DIAGNOSIS — E03.9 HYPOTHYROIDISM, UNSPECIFIED TYPE: Primary | ICD-10-CM

## 2021-05-28 DIAGNOSIS — E05.90 HYPERTHYROIDISM: ICD-10-CM

## 2021-05-28 RX ORDER — LEVOTHYROXINE SODIUM 100 MCG
100 TABLET ORAL DAILY
Qty: 90 TABLET | Refills: 0 | Status: SHIPPED | OUTPATIENT
Start: 2021-05-28 | End: 2021-06-04 | Stop reason: ALTCHOICE

## 2021-05-28 NOTE — TELEPHONE ENCOUNTER
Decrease to 100mcg and recheck TSH in 4 weeks  Pls mail order  Not sure if the brand will be covered but I sent it anyway   Must schedule wellness

## 2021-05-28 NOTE — TELEPHONE ENCOUNTER
David Zamudio called to advise she just spoke to her Oncologist and they suggest a lower dose of her thyroid med of  75 mg  Pt would like you to review the most recent blood work and confirm if she should take the lower dose  Pt also would like to try the brand name and would like to try a 90 day supply

## 2021-06-04 DIAGNOSIS — E03.9 HYPOTHYROIDISM, UNSPECIFIED TYPE: ICD-10-CM

## 2021-06-04 RX ORDER — LEVOTHYROXINE SODIUM 0.1 MG/1
100 TABLET ORAL DAILY
Qty: 90 TABLET | Refills: 2 | Status: SHIPPED | OUTPATIENT
Start: 2021-06-04 | End: 2022-04-19 | Stop reason: SDUPTHER

## 2021-06-08 ENCOUNTER — RA CDI HCC (OUTPATIENT)
Dept: OTHER | Facility: HOSPITAL | Age: 61
End: 2021-06-08

## 2021-06-08 NOTE — PROGRESS NOTES
[facility]  coding opportunities          Chart reviewed, no opportunity found: CHART REVIEWED, NO OPPORTUNITY FOUND              Patients insurance company: [employer] (Medicare [employer]vantage and Commercial)

## 2022-04-18 ENCOUNTER — TELEPHONE (OUTPATIENT)
Dept: INTERNAL MEDICINE CLINIC | Facility: CLINIC | Age: 62
End: 2022-04-18

## 2022-04-18 RX ORDER — FLECAINIDE ACETATE 50 MG/1
50 TABLET ORAL 2 TIMES DAILY
COMMUNITY
Start: 2022-01-18

## 2022-04-18 NOTE — TELEPHONE ENCOUNTER
Patient made an appt for 04/19, the last dose of levothyroxine 100 mcg tablet     was Friday 04/15, she would like to know if the script can be called in since she made an appt for tomorrow?     Please Advise:  Verito Mcgee - [phone]

## 2022-04-19 ENCOUNTER — OFFICE VISIT (OUTPATIENT)
Dept: INTERNAL MEDICINE CLINIC | Facility: CLINIC | Age: 62
End: 2022-04-19
Payer: COMMERCIAL

## 2022-04-19 VITALS
DIASTOLIC BLOOD PRESSURE: 66 MMHG | HEIGHT: 65 IN | HEART RATE: 70 BPM | SYSTOLIC BLOOD PRESSURE: 104 MMHG | OXYGEN SATURATION: 98 % | TEMPERATURE: 97.9 F | BODY MASS INDEX: 23.53 KG/M2 | RESPIRATION RATE: 16 BRPM | WEIGHT: 141.2 LBS

## 2022-04-19 DIAGNOSIS — I47.1 ECTOPIC ATRIAL TACHYCARDIA (HCC): ICD-10-CM

## 2022-04-19 DIAGNOSIS — Z13.220 SCREENING, LIPID: ICD-10-CM

## 2022-04-19 DIAGNOSIS — C50.812 MALIGNANT NEOPLASM OF OVERLAPPING SITES OF LEFT BREAST IN FEMALE, ESTROGEN RECEPTOR NEGATIVE (HCC): ICD-10-CM

## 2022-04-19 DIAGNOSIS — E03.9 HYPOTHYROIDISM, UNSPECIFIED TYPE: Primary | ICD-10-CM

## 2022-04-19 DIAGNOSIS — Z17.1 MALIGNANT NEOPLASM OF OVERLAPPING SITES OF LEFT BREAST IN FEMALE, ESTROGEN RECEPTOR NEGATIVE (HCC): ICD-10-CM

## 2022-04-19 PROBLEM — I47.19 ECTOPIC ATRIAL TACHYCARDIA: Status: ACTIVE | Noted: 2022-04-19

## 2022-04-19 PROCEDURE — 99214 OFFICE O/P EST MOD 30 MIN: CPT | Performed by: INTERNAL MEDICINE

## 2022-04-19 PROCEDURE — 3008F BODY MASS INDEX DOCD: CPT | Performed by: INTERNAL MEDICINE

## 2022-04-19 PROCEDURE — 1036F TOBACCO NON-USER: CPT | Performed by: INTERNAL MEDICINE

## 2022-04-19 PROCEDURE — 3725F SCREEN DEPRESSION PERFORMED: CPT | Performed by: INTERNAL MEDICINE

## 2022-04-19 RX ORDER — LEVOTHYROXINE SODIUM 0.1 MG/1
100 TABLET ORAL DAILY
Qty: 90 TABLET | Refills: 0 | Status: SHIPPED | OUTPATIENT
Start: 2022-04-19 | End: 2022-05-25

## 2022-04-19 NOTE — PROGRESS NOTES
Assessment/Plan:    Hypothyroidism  Check TSH in the next 2 weeks    Ectopic atrial tachycardia (HCC)  Controlled on flecainide    Malignant neoplasm of left breast in female, estrogen receptor negative ([address] )  Lumpectomy in 2016  Up to date with mammogram         Problem List Items Addressed This Visit        Endocrine    Hypothyroidism - Primary     Check TSH in the next 2 weeks         Relevant Medications    levothyroxine 100 mcg tablet       Cardiovascular and Mediastinum    Ectopic atrial tachycardia (HCC)     Controlled on flecainide            Other    Malignant neoplasm of left breast in female, estrogen receptor negative ([address] )     Lumpectomy in 2016  Up to date with mammogram           Other Visit Diagnoses     Screening, lipid        Relevant Orders    Lipid Panel with Direct LDL reflex    TSH, 3rd generation with Free T4 reflex            Subjective:      Patient ID: Mikey Fonseca Parent is a 63 y o  female  HPI  Here for a follow up  She was last seen in the office Dec 2019  Hypothyroidism on levothyroxine 100mcg  She ran out 4 days ago  Ectopic atrial tachycardia on flecainide  Continues to experience palpitations at night before bed  Left  breast cancer s/p lumpectomy    The following portions of the patient's history were reviewed and updated as appropriate: allergies, current medications, past family history, past medical history, past social history, past surgical history and problem list     Review of Systems   Constitutional: Negative for chills, fever and unexpected weight change  HENT: Negative for rhinorrhea  Respiratory: Negative for cough and shortness of breath  Cardiovascular: Positive for palpitations  Negative for chest pain and leg swelling  +spider veins right >left leg   Gastrointestinal: Negative for constipation and diarrhea  Neurological: Negative for dizziness and headaches           Objective:      /66   Pulse 70   Temp 97 9 °F (36 6 °C)   Resp 16   Ht 5' 5" (1 651 m)   Wt 64 kg (141 lb 3 2 oz)   SpO2 98%   BMI 23 50 kg/m²          Physical Exam  Constitutional:       General: She is not in acute distress  Appearance: She is well-developed  She is not ill-appearing, toxic-appearing or diaphoretic  HENT:      Head: Normocephalic and atraumatic  Right Ear: External ear normal  There is no impacted cerumen  Left Ear: External ear normal  There is no impacted cerumen  Eyes:      Conjunctiva/sclera: Conjunctivae normal    Cardiovascular:      Rate and Rhythm: Normal rate and regular rhythm  Heart sounds: Normal heart sounds  No murmur heard  Pulmonary:      Effort: Pulmonary effort is normal  No respiratory distress  Breath sounds: Normal breath sounds  No wheezing or rales  Abdominal:      General: There is no distension  Palpations: Abdomen is soft  There is no mass  Tenderness: There is no abdominal tenderness  There is no guarding or rebound  Musculoskeletal:      Cervical back: Neck supple  Right lower leg: No edema  Left lower leg: No edema  Comments: Spider veins on the right inner calf   Skin:     General: Skin is warm and dry  Neurological:      Mental Status: She is alert and oriented to person, place, and time  Psychiatric:         Mood and Affect: Mood normal          Behavior: Behavior normal          Thought Content:  Thought content normal          Judgment: Judgment normal

## 2022-04-20 ENCOUNTER — TELEPHONE (OUTPATIENT)
Dept: ADMINISTRATIVE | Facility: OTHER | Age: 62
End: 2022-04-20

## 2022-04-20 NOTE — TELEPHONE ENCOUNTER
Upon review of the In Basket request we were able to locate, review, and update the patient chart as requested for CRC: Colonoscopy and Mammogram     Any additional questions or concerns should be emailed to the Practice Liaisons via [email or web address] email, please do not reply via In Basket      Thank you  Tin Hart

## 2022-04-20 NOTE — TELEPHONE ENCOUNTER
----- Message from Laura Kay MD sent at 4/19/2022 12:19 PM EDT -----  04/19/22 12:19 PM    Hello, our patient Liya Castorena Parent has had CRC: Colonoscopy and Mammogram completed/performed  Please assist in updating the patient chart by pulling the Care Everywhere (CE) document  The date of service is 12/6/2018 (colon) and 12/3/2021 (mammo)       Thank you,  Laura Kay MD  [facility]

## 2022-05-25 DIAGNOSIS — E03.9 HYPOTHYROIDISM, UNSPECIFIED TYPE: ICD-10-CM

## 2022-05-25 RX ORDER — LEVOTHYROXINE SODIUM 0.1 MG/1
TABLET ORAL
Qty: 90 TABLET | Refills: 0 | Status: SHIPPED | OUTPATIENT
Start: 2022-05-25

## 2022-06-01 ENCOUNTER — TELEPHONE (OUTPATIENT)
Dept: INTERNAL MEDICINE CLINIC | Facility: CLINIC | Age: 62
End: 2022-06-01

## 2022-06-01 NOTE — TELEPHONE ENCOUNTER
----- Message from Brandie Hernandez MD sent at 5/30/2022 10:28 PM EDT -----  Please call with normal cholesterol and thyroid function

## 2022-06-01 NOTE — TELEPHONE ENCOUNTER
I left a message on voice mail with results  I advised the patient to call back if she has any questions

## 2022-11-25 ENCOUNTER — TELEPHONE (OUTPATIENT)
Dept: INTERNAL MEDICINE CLINIC | Facility: CLINIC | Age: 62
End: 2022-11-25

## 2022-11-25 NOTE — TELEPHONE ENCOUNTER
The patient has lower left abdominal pain  Her stools are black   The patient was advised By Margoth Molina to go to the emergency room possible GI bleed

## 2022-11-28 NOTE — TELEPHONE ENCOUNTER
Pt called in an made an appointment with Scott Ng for Thursday 12/1for abdominal pain/black stools  Pt refused earlier appt  This call was triaged to myself  Pt reports she did not go to the ER as previously advised  She did however consult with a family friend GI doctor  He suggested she might have diverticulitis and to follow up with her PCP  Advised pt again of recommendation to go to the ER, but pt states she does not want to go  She reports feeling somewhat better  Pain level on Friday was "90%" and is only "30%" at present  She states her stools are somewhat back to normal now  Advised pt if her symptoms worsen, she needs to go immediately to the ER - verbalized understanding

## 2022-12-01 ENCOUNTER — OFFICE VISIT (OUTPATIENT)
Dept: INTERNAL MEDICINE CLINIC | Facility: CLINIC | Age: 62
End: 2022-12-01

## 2022-12-01 VITALS
HEART RATE: 83 BPM | HEIGHT: 66 IN | OXYGEN SATURATION: 98 % | SYSTOLIC BLOOD PRESSURE: 120 MMHG | TEMPERATURE: 96.9 F | DIASTOLIC BLOOD PRESSURE: 60 MMHG | WEIGHT: 139.5 LBS | BODY MASS INDEX: 22.42 KG/M2

## 2022-12-01 DIAGNOSIS — R10.32 LEFT LOWER QUADRANT ABDOMINAL PAIN: Primary | ICD-10-CM

## 2022-12-01 NOTE — PROGRESS NOTES
Assessment/Plan:    1  Left lower quadrant abdominal pain  -     CT abdomen pelvis w contrast; Future; Expected date: 12/01/2022        The case discussed with patient using patient centered shared decision making  The patient was counseled regarding instructions for management,-- risk factor reductions,-- prognosis,-- impressions,-- risks and benefits of treatment options,-- importance of compliance with treatment  I have reviewed the instructions with the patient, answering all questions to her satisfaction  Exam not consistent with acute diverticulitis  Only symptom is minor soreness in lower abdomen  Will check CT AP to r/o subclinical diverticulitis, or syndrome  Will follow up with her GI  Next colon due 12/2023      There are no Patient Instructions on file for this visit  No follow-ups on file  Subjective:      Patient ID: Yvette Ram Parent is a 63 y o  female  Chief Complaint   Patient presents with   • Abdominal Pain     Patient have abdominal pain on Saturday, also she had diarrhea and vomiting  64 yo presents for eval of acute onset of LLQ pain 11/25 accompanied by vomiting, diarrhea, fatigue, chills  Unable to come for eval until today  She endorses that a family friend gastroenterologist examined her at time of acute symptomology and suggested diverticulitis vs diverticulosis    Today she reports that she is feeling well, symptoms resolved for most part  Lower abd feels sore    +h/o colonoscopy 2018  Impression:  Findings:        The perianal and digital rectal examinations were normal  Pertinent        negatives include normal sphincter tone and no palpable rectal lesions         Retroflexion in the right colon was performed  The ascending colon and        hepatic flexure appeared normal         A few medium-mouthed diverticula were found in the sigmoid colon         Non-bleeding internal hemorrhoids were found during retroflexion  The        hemorrhoids were mild           The following portions of the patient's history were reviewed and updated as appropriate: allergies, current medications, past family history, past medical history, past social history, past surgical history and problem list     Review of Systems   Constitutional: Negative for fatigue and fever  Gastrointestinal: Positive for abdominal pain  Negative for blood in stool, constipation, diarrhea, nausea and vomiting  Genitourinary: Negative  Neurological: Negative for dizziness and weakness  Current Outpatient Medications   Medication Sig Dispense Refill   • flecainide (TAMBOCOR) 50 mg tablet Take 50 mg by mouth 2 (two) times a day     • levothyroxine 100 mcg tablet TAKE 1 TABLET BY MOUTH EVERY DAY 90 tablet 0     No current facility-administered medications for this visit  Objective:    /60 (BP Location: Right arm, Patient Position: Sitting, Cuff Size: Adult)   Pulse 83   Temp (!) 96 9 °F (36 1 °C) (Tympanic)   Ht 5' 6 34" (1 685 m)   Wt 63 3 kg (139 lb 8 oz)   SpO2 98%   BMI 22 29 kg/m²        Physical Exam  Vitals and nursing note reviewed  Constitutional:       General: She is not in acute distress  Appearance: She is well-developed  She is not ill-appearing  Cardiovascular:      Rate and Rhythm: Normal rate and regular rhythm  Heart sounds: Normal heart sounds  Pulmonary:      Effort: Pulmonary effort is normal       Breath sounds: Normal breath sounds  Abdominal:      General: Abdomen is flat  Bowel sounds are normal  There is no distension  Palpations: Abdomen is soft  There is no hepatomegaly or mass  Tenderness: There is no guarding or rebound  Skin:     Coloration: Skin is not pale  Neurological:      General: No focal deficit present  Mental Status: She is alert                  Grace Baron, [address] [place] [address]

## 2023-12-22 ENCOUNTER — TELEPHONE (OUTPATIENT)
Age: 63
End: 2023-12-22

## 2023-12-22 DIAGNOSIS — N30.00 ACUTE CYSTITIS WITHOUT HEMATURIA: Primary | ICD-10-CM

## 2023-12-22 RX ORDER — NITROFURANTOIN 25; 75 MG/1; MG/1
100 CAPSULE ORAL 2 TIMES DAILY
Qty: 10 CAPSULE | Refills: 0 | Status: SHIPPED | OUTPATIENT
Start: 2023-12-22 | End: 2023-12-27

## 2023-12-22 NOTE — TELEPHONE ENCOUNTER
Patient has UTI symptoms and would like an ABX sent to the [facility] in [place]. Please order.  Michelle Gil

## 2023-12-22 NOTE — TELEPHONE ENCOUNTER
Advised patient - verbalized understanding. Patient reported she saw another doctor today explained her symptoms and was give ciprofloxicin 500 mg take 1 tablet bid Qty20. Checked with pcp that this was ok and she agrees, advised patient. Done.